# Patient Record
Sex: MALE | Race: BLACK OR AFRICAN AMERICAN | NOT HISPANIC OR LATINO | URBAN - METROPOLITAN AREA
[De-identification: names, ages, dates, MRNs, and addresses within clinical notes are randomized per-mention and may not be internally consistent; named-entity substitution may affect disease eponyms.]

---

## 2017-09-11 ENCOUNTER — EMERGENCY (EMERGENCY)
Facility: HOSPITAL | Age: 57
LOS: 1 days | Discharge: HOME | End: 2017-09-11
Attending: EMERGENCY MEDICINE | Admitting: EMERGENCY MEDICINE
Payer: MEDICARE

## 2017-09-11 VITALS
HEART RATE: 124 BPM | SYSTOLIC BLOOD PRESSURE: 134 MMHG | TEMPERATURE: 101 F | WEIGHT: 151.9 LBS | OXYGEN SATURATION: 97 % | RESPIRATION RATE: 18 BRPM | DIASTOLIC BLOOD PRESSURE: 89 MMHG

## 2017-09-11 DIAGNOSIS — J44.9 CHRONIC OBSTRUCTIVE PULMONARY DISEASE, UNSPECIFIED: ICD-10-CM

## 2017-09-11 DIAGNOSIS — J20.9 ACUTE BRONCHITIS, UNSPECIFIED: ICD-10-CM

## 2017-09-11 DIAGNOSIS — F17.210 NICOTINE DEPENDENCE, CIGARETTES, UNCOMPLICATED: ICD-10-CM

## 2017-09-11 DIAGNOSIS — R07.89 OTHER CHEST PAIN: ICD-10-CM

## 2017-09-11 DIAGNOSIS — Z79.82 LONG TERM (CURRENT) USE OF ASPIRIN: ICD-10-CM

## 2017-09-11 DIAGNOSIS — Z91.013 ALLERGY TO SEAFOOD: ICD-10-CM

## 2017-09-11 DIAGNOSIS — Z79.899 OTHER LONG TERM (CURRENT) DRUG THERAPY: ICD-10-CM

## 2017-09-11 DIAGNOSIS — E78.5 HYPERLIPIDEMIA, UNSPECIFIED: ICD-10-CM

## 2017-09-11 LAB
BASOPHILS NFR BLD AUTO: 1 % — SIGNIFICANT CHANGE UP (ref 0–2)
EOSINOPHIL NFR BLD AUTO: 3.8 % — SIGNIFICANT CHANGE UP (ref 0–6)
HCT VFR BLD CALC: 41.5 % — SIGNIFICANT CHANGE UP (ref 39–50)
HGB BLD-MCNC: 14 G/DL — SIGNIFICANT CHANGE UP (ref 13–17)
LACTATE SERPL-SCNC: 2 MMOL/L — SIGNIFICANT CHANGE UP (ref 0.5–2)
LYMPHOCYTES # BLD AUTO: 33.7 % — SIGNIFICANT CHANGE UP (ref 13–44)
MCHC RBC-ENTMCNC: 32.1 PG — SIGNIFICANT CHANGE UP (ref 27–34)
MCHC RBC-ENTMCNC: 33.7 G/DL — SIGNIFICANT CHANGE UP (ref 32–36)
MCV RBC AUTO: 95.2 FL — SIGNIFICANT CHANGE UP (ref 80–100)
MONOCYTES NFR BLD AUTO: 9.9 % — SIGNIFICANT CHANGE UP (ref 2–14)
NEUTROPHILS NFR BLD AUTO: 51.6 % — SIGNIFICANT CHANGE UP (ref 43–77)
PLATELET # BLD AUTO: 254 K/UL — SIGNIFICANT CHANGE UP (ref 150–400)
RBC # BLD: 4.36 M/UL — SIGNIFICANT CHANGE UP (ref 4.2–5.8)
RBC # FLD: 13.4 % — SIGNIFICANT CHANGE UP (ref 10.3–16.9)
WBC # BLD: 5.8 K/UL — SIGNIFICANT CHANGE UP (ref 3.8–10.5)
WBC # FLD AUTO: 5.8 K/UL — SIGNIFICANT CHANGE UP (ref 3.8–10.5)

## 2017-09-11 PROCEDURE — 99285 EMERGENCY DEPT VISIT HI MDM: CPT | Mod: 25

## 2017-09-11 PROCEDURE — 93010 ELECTROCARDIOGRAM REPORT: CPT

## 2017-09-11 PROCEDURE — 71020: CPT | Mod: 26

## 2017-09-11 RX ORDER — ACETAMINOPHEN 500 MG
650 TABLET ORAL ONCE
Qty: 0 | Refills: 0 | Status: COMPLETED | OUTPATIENT
Start: 2017-09-11 | End: 2017-09-11

## 2017-09-11 RX ORDER — KETOROLAC TROMETHAMINE 30 MG/ML
30 SYRINGE (ML) INJECTION ONCE
Qty: 0 | Refills: 0 | Status: DISCONTINUED | OUTPATIENT
Start: 2017-09-11 | End: 2017-09-11

## 2017-09-11 RX ORDER — SODIUM CHLORIDE 9 MG/ML
1000 INJECTION INTRAMUSCULAR; INTRAVENOUS; SUBCUTANEOUS ONCE
Qty: 0 | Refills: 0 | Status: COMPLETED | OUTPATIENT
Start: 2017-09-11 | End: 2017-09-11

## 2017-09-11 RX ADMIN — Medication 650 MILLIGRAM(S): at 23:42

## 2017-09-11 RX ADMIN — Medication 30 MILLIGRAM(S): at 23:42

## 2017-09-11 RX ADMIN — SODIUM CHLORIDE 2000 MILLILITER(S): 9 INJECTION INTRAMUSCULAR; INTRAVENOUS; SUBCUTANEOUS at 23:42

## 2017-09-11 NOTE — ED ADULT NURSE NOTE - OBJECTIVE STATEMENT
Pt to the ER c/o left sided chest pain, shortness of breath, cough with mucous, low grade fever/chills for past few days.  Associated with left arm discomfort/ numbness that has been there on and off for past 6 years.  Did not take anything for symptoms today.  + smoking, denies drug use.  Pt denies fevers, sick contacts, or travel out of the country in the last 3 weeks.  Upon arrival to the ER, pt is AAO X 4, fully verbal and mobile, gait steady.  Torso and extremities are warm and dry, skin color natural, and turgor normal.  Mucus membranes moist and pink.  HEENT intact. +PERRLA.  Trachea midline. No JVD.  Lungs sound  CTA X 4.  Resps slow and even, non labored.  S1, S2. No crepitus or signs of trauma.  Abdomen soft and round, non distended, and non tender to palpation with normal active BS noted X 4.  Call bell in reach, patient's bed locked, and placed in lowest position.  Pt provided with emotional support, comfort, frequent rounding, and POC reviewed.  Understanding verbalized and denies additional needs at this time. Will continue to monitor.

## 2017-09-11 NOTE — ED PROVIDER NOTE - OBJECTIVE STATEMENT
here with stabbing left sided chest pain, shortness of breath, cough with mucous, low grade fever/chills for past few days.  Associated with left arm discomfort/ numbness that has been there on and off for past 6 years.  Did not take anything for symptoms today.  + smoking, denies drug use

## 2017-09-11 NOTE — ED PROVIDER NOTE - MEDICAL DECISION MAKING DETAILS
cough, sob, fever, concern for pneumonia/ acute bronchitis/ less likely pe, acs, pneumothorax.  cxr done along with labs and ecg.  tylenol/toradol and fluid bolus for symptoms with improvement.  left side/arm pain chronic per history. cough, sob, fever, concern for pneumonia/ acute bronchitis/ less likely pe, acs, pneumothorax.  cxr done along with labs and ecg.  tylenol/toradol and fluid bolus for symptoms with improvement.  left side/arm pain chronic per history.  given history of copd, will treat with zpak

## 2017-09-11 NOTE — ED PROVIDER NOTE - PROGRESS NOTE DETAILS
states he is feeling better but just anxious.  discussed normal labs, cxr.  requesting to go home but would like a dose of ativan before discharge

## 2017-09-11 NOTE — ED ADULT NURSE NOTE - PMH
Anxiety    Chronic back pain greater than 3 months duration    COPD (chronic obstructive pulmonary disease)    High cholesterol    Insomnia

## 2017-09-12 VITALS
RESPIRATION RATE: 16 BRPM | HEART RATE: 110 BPM | SYSTOLIC BLOOD PRESSURE: 146 MMHG | DIASTOLIC BLOOD PRESSURE: 96 MMHG | TEMPERATURE: 98 F | OXYGEN SATURATION: 96 %

## 2017-09-12 LAB
ALBUMIN SERPL ELPH-MCNC: 4.7 G/DL — SIGNIFICANT CHANGE UP (ref 3.3–5)
ALP SERPL-CCNC: 134 U/L — HIGH (ref 40–120)
ALT FLD-CCNC: 28 U/L — SIGNIFICANT CHANGE UP (ref 10–45)
ANION GAP SERPL CALC-SCNC: 18 MMOL/L — HIGH (ref 5–17)
APPEARANCE UR: CLEAR — SIGNIFICANT CHANGE UP
APTT BLD: 30.2 SEC — SIGNIFICANT CHANGE UP (ref 27.5–37.4)
AST SERPL-CCNC: 31 U/L — SIGNIFICANT CHANGE UP (ref 10–40)
BILIRUB SERPL-MCNC: 0.2 MG/DL — SIGNIFICANT CHANGE UP (ref 0.2–1.2)
BILIRUB UR-MCNC: NEGATIVE — SIGNIFICANT CHANGE UP
BUN SERPL-MCNC: 10 MG/DL — SIGNIFICANT CHANGE UP (ref 7–23)
CALCIUM SERPL-MCNC: 9.4 MG/DL — SIGNIFICANT CHANGE UP (ref 8.4–10.5)
CHLORIDE SERPL-SCNC: 97 MMOL/L — SIGNIFICANT CHANGE UP (ref 96–108)
CK MB CFR SERPL CALC: 1 NG/ML — SIGNIFICANT CHANGE UP (ref 0–6.7)
CO2 SERPL-SCNC: 25 MMOL/L — SIGNIFICANT CHANGE UP (ref 22–31)
COLOR SPEC: YELLOW — SIGNIFICANT CHANGE UP
CREAT SERPL-MCNC: 1 MG/DL — SIGNIFICANT CHANGE UP (ref 0.5–1.3)
DIFF PNL FLD: NEGATIVE — SIGNIFICANT CHANGE UP
GLUCOSE SERPL-MCNC: 104 MG/DL — HIGH (ref 70–99)
GLUCOSE UR QL: NEGATIVE — SIGNIFICANT CHANGE UP
INR BLD: 0.96 — SIGNIFICANT CHANGE UP (ref 0.88–1.16)
KETONES UR-MCNC: NEGATIVE — SIGNIFICANT CHANGE UP
LEUKOCYTE ESTERASE UR-ACNC: NEGATIVE — SIGNIFICANT CHANGE UP
NITRITE UR-MCNC: NEGATIVE — SIGNIFICANT CHANGE UP
PH UR: 6 — SIGNIFICANT CHANGE UP (ref 5–8)
POTASSIUM SERPL-MCNC: 3.7 MMOL/L — SIGNIFICANT CHANGE UP (ref 3.5–5.3)
POTASSIUM SERPL-SCNC: 3.7 MMOL/L — SIGNIFICANT CHANGE UP (ref 3.5–5.3)
PROT SERPL-MCNC: 8.2 G/DL — SIGNIFICANT CHANGE UP (ref 6–8.3)
PROT UR-MCNC: NEGATIVE MG/DL — SIGNIFICANT CHANGE UP
PROTHROM AB SERPL-ACNC: 10.7 SEC — SIGNIFICANT CHANGE UP (ref 9.8–12.7)
SODIUM SERPL-SCNC: 140 MMOL/L — SIGNIFICANT CHANGE UP (ref 135–145)
SP GR SPEC: 1.01 — SIGNIFICANT CHANGE UP (ref 1–1.03)
TROPONIN T SERPL-MCNC: <0.01 NG/ML — SIGNIFICANT CHANGE UP (ref 0–0.01)
UROBILINOGEN FLD QL: 0.2 E.U./DL — SIGNIFICANT CHANGE UP

## 2017-09-12 PROCEDURE — 96374 THER/PROPH/DIAG INJ IV PUSH: CPT

## 2017-09-12 PROCEDURE — 80053 COMPREHEN METABOLIC PANEL: CPT

## 2017-09-12 PROCEDURE — 71046 X-RAY EXAM CHEST 2 VIEWS: CPT

## 2017-09-12 PROCEDURE — 83605 ASSAY OF LACTIC ACID: CPT

## 2017-09-12 PROCEDURE — 93005 ELECTROCARDIOGRAM TRACING: CPT

## 2017-09-12 PROCEDURE — 82550 ASSAY OF CK (CPK): CPT

## 2017-09-12 PROCEDURE — 99284 EMERGENCY DEPT VISIT MOD MDM: CPT | Mod: 25

## 2017-09-12 PROCEDURE — 81003 URINALYSIS AUTO W/O SCOPE: CPT

## 2017-09-12 PROCEDURE — 85730 THROMBOPLASTIN TIME PARTIAL: CPT

## 2017-09-12 PROCEDURE — 87040 BLOOD CULTURE FOR BACTERIA: CPT

## 2017-09-12 PROCEDURE — 84484 ASSAY OF TROPONIN QUANT: CPT

## 2017-09-12 PROCEDURE — 87086 URINE CULTURE/COLONY COUNT: CPT

## 2017-09-12 PROCEDURE — 85610 PROTHROMBIN TIME: CPT

## 2017-09-12 PROCEDURE — 82553 CREATINE MB FRACTION: CPT

## 2017-09-12 PROCEDURE — 36415 COLL VENOUS BLD VENIPUNCTURE: CPT

## 2017-09-12 PROCEDURE — 85025 COMPLETE CBC W/AUTO DIFF WBC: CPT

## 2017-09-12 RX ORDER — TRAZODONE HCL 50 MG
2 TABLET ORAL
Qty: 0 | Refills: 0 | COMMUNITY

## 2017-09-12 RX ORDER — AZITHROMYCIN 500 MG/1
2 TABLET, FILM COATED ORAL
Qty: 6 | Refills: 0 | OUTPATIENT
Start: 2017-09-12

## 2017-09-12 RX ORDER — ZOLPIDEM TARTRATE 10 MG/1
1 TABLET ORAL
Qty: 0 | Refills: 0 | COMMUNITY

## 2017-09-12 RX ORDER — QUETIAPINE FUMARATE 200 MG/1
1 TABLET, FILM COATED ORAL
Qty: 0 | Refills: 0 | COMMUNITY

## 2017-09-12 RX ORDER — ASPIRIN/CALCIUM CARB/MAGNESIUM 324 MG
1 TABLET ORAL
Qty: 0 | Refills: 0 | COMMUNITY

## 2017-09-12 RX ORDER — IBUPROFEN 200 MG
1 TABLET ORAL
Qty: 30 | Refills: 0 | OUTPATIENT
Start: 2017-09-12

## 2017-09-12 RX ADMIN — Medication 1 MILLIGRAM(S): at 02:07

## 2017-09-12 RX ADMIN — Medication 30 MILLIGRAM(S): at 01:54

## 2017-09-13 LAB
CULTURE RESULTS: NO GROWTH — SIGNIFICANT CHANGE UP
SPECIMEN SOURCE: SIGNIFICANT CHANGE UP

## 2017-09-17 LAB
CULTURE RESULTS: SIGNIFICANT CHANGE UP
CULTURE RESULTS: SIGNIFICANT CHANGE UP
SPECIMEN SOURCE: SIGNIFICANT CHANGE UP
SPECIMEN SOURCE: SIGNIFICANT CHANGE UP

## 2019-08-28 PROBLEM — J44.9 CHRONIC OBSTRUCTIVE PULMONARY DISEASE, UNSPECIFIED: Chronic | Status: ACTIVE | Noted: 2017-09-11

## 2019-08-28 PROBLEM — M54.9 DORSALGIA, UNSPECIFIED: Chronic | Status: ACTIVE | Noted: 2017-09-12

## 2019-08-28 PROBLEM — F41.9 ANXIETY DISORDER, UNSPECIFIED: Chronic | Status: ACTIVE | Noted: 2017-09-12

## 2019-08-28 PROBLEM — E78.00 PURE HYPERCHOLESTEROLEMIA, UNSPECIFIED: Chronic | Status: ACTIVE | Noted: 2017-09-11

## 2019-08-28 PROBLEM — G47.00 INSOMNIA, UNSPECIFIED: Chronic | Status: ACTIVE | Noted: 2017-09-12

## 2019-08-29 ENCOUNTER — APPOINTMENT (OUTPATIENT)
Dept: INTERNAL MEDICINE | Facility: HOME HEALTH | Age: 59
End: 2019-08-29
Payer: MEDICARE

## 2019-08-29 VITALS
HEART RATE: 101 BPM | OXYGEN SATURATION: 97 % | DIASTOLIC BLOOD PRESSURE: 90 MMHG | BODY MASS INDEX: 24.01 KG/M2 | WEIGHT: 153 LBS | HEIGHT: 67 IN | RESPIRATION RATE: 18 BRPM | TEMPERATURE: 98.1 F | SYSTOLIC BLOOD PRESSURE: 150 MMHG

## 2019-08-29 DIAGNOSIS — Z00.00 ENCOUNTER FOR GENERAL ADULT MEDICAL EXAMINATION W/OUT ABNORMAL FINDINGS: ICD-10-CM

## 2019-08-29 DIAGNOSIS — I25.2 OLD MYOCARDIAL INFARCTION: ICD-10-CM

## 2019-08-29 PROCEDURE — 99345 HOME/RES VST NEW HIGH MDM 75: CPT

## 2019-08-29 PROCEDURE — 99497 ADVNCD CARE PLAN 30 MIN: CPT

## 2019-08-29 PROCEDURE — 99406 BEHAV CHNG SMOKING 3-10 MIN: CPT

## 2019-08-31 PROBLEM — I25.2 HISTORY OF MYOCARDIAL INFARCTION: Status: RESOLVED | Noted: 2019-08-31 | Resolved: 2019-08-31

## 2019-08-31 PROBLEM — Z00.00 ENCOUNTER FOR PREVENTIVE HEALTH EXAMINATION: Status: ACTIVE | Noted: 2019-08-29

## 2019-09-09 LAB
25(OH)D3 SERPL-MCNC: 20 NG/ML
ALBUMIN SERPL ELPH-MCNC: 4.7 G/DL
ALP BLD-CCNC: 97 U/L
ALT SERPL-CCNC: 29 U/L
ANION GAP SERPL CALC-SCNC: 21 MMOL/L
AST SERPL-CCNC: 29 U/L
BASOPHILS # BLD AUTO: 0.05 K/UL
BASOPHILS NFR BLD AUTO: 1.3 %
BILIRUB SERPL-MCNC: 0.4 MG/DL
BUN SERPL-MCNC: 19 MG/DL
CALCIUM SERPL-MCNC: 10 MG/DL
CHLORIDE SERPL-SCNC: 92 MMOL/L
CHOLEST SERPL-MCNC: 194 MG/DL
CHOLEST/HDLC SERPL: 3.9 RATIO
CO2 SERPL-SCNC: 22 MMOL/L
CREAT SERPL-MCNC: 1.34 MG/DL
EOSINOPHIL # BLD AUTO: 0.04 K/UL
EOSINOPHIL NFR BLD AUTO: 1 %
ESTIMATED AVERAGE GLUCOSE: 108 MG/DL
GLUCOSE SERPL-MCNC: 101 MG/DL
HBA1C MFR BLD HPLC: 5.4 %
HBV SURFACE AB SERPL IA-ACNC: 704 MIU/ML
HCT VFR BLD CALC: 43.5 %
HCV RNA SERPL NAA DL=5-ACNC: NOT DETECTED IU/ML
HCV RNA SERPL NAA+PROBE-LOG IU: NOT DETECTED LOGIU/ML
HDLC SERPL-MCNC: 50 MG/DL
HGB BLD-MCNC: 15.1 G/DL
HIV1+2 AB SPEC QL IA.RAPID: NONREACTIVE
IMM GRANULOCYTES NFR BLD AUTO: 0.3 %
IRON SATN MFR SERPL: 41 %
IRON SERPL-MCNC: 141 UG/DL
LDLC SERPL CALC-MCNC: NORMAL MG/DL
LYMPHOCYTES # BLD AUTO: 1.59 K/UL
LYMPHOCYTES NFR BLD AUTO: 41.6 %
MAN DIFF?: NORMAL
MCHC RBC-ENTMCNC: 34.3 PG
MCHC RBC-ENTMCNC: 34.7 GM/DL
MCV RBC AUTO: 98.9 FL
MONOCYTES # BLD AUTO: 0.39 K/UL
MONOCYTES NFR BLD AUTO: 10.2 %
NEUTROPHILS # BLD AUTO: 1.74 K/UL
NEUTROPHILS NFR BLD AUTO: 45.6 %
PLATELET # BLD AUTO: 303 K/UL
POTASSIUM SERPL-SCNC: 5 MMOL/L
PROT SERPL-MCNC: 7.7 G/DL
PSA FREE FLD-MCNC: 25 %
PSA FREE SERPL-MCNC: 0.61 NG/ML
PSA SERPL-MCNC: 2.41 NG/ML
RBC # BLD: 4.4 M/UL
RBC # FLD: 13.4 %
SODIUM SERPL-SCNC: 135 MMOL/L
T4 FREE SERPL-MCNC: 1.2 NG/DL
TIBC SERPL-MCNC: 341 UG/DL
TRIGL SERPL-MCNC: 765 MG/DL
TSH SERPL-ACNC: 1.05 UIU/ML
UIBC SERPL-MCNC: 200 UG/DL
VIT B12 SERPL-MCNC: 489 PG/ML
WBC # FLD AUTO: 3.82 K/UL

## 2019-09-26 ENCOUNTER — APPOINTMENT (OUTPATIENT)
Dept: INTERNAL MEDICINE | Facility: HOME HEALTH | Age: 59
End: 2019-09-26
Payer: MEDICARE

## 2019-09-26 VITALS
DIASTOLIC BLOOD PRESSURE: 70 MMHG | HEART RATE: 101 BPM | OXYGEN SATURATION: 96 % | SYSTOLIC BLOOD PRESSURE: 130 MMHG | RESPIRATION RATE: 16 BRPM | TEMPERATURE: 97.7 F

## 2019-09-26 PROCEDURE — 99350 HOME/RES VST EST HIGH MDM 60: CPT

## 2019-09-26 PROCEDURE — G0008: CPT

## 2019-09-26 PROCEDURE — 90662 IIV NO PRSV INCREASED AG IM: CPT

## 2019-09-27 ENCOUNTER — RX RENEWAL (OUTPATIENT)
Age: 59
End: 2019-09-27

## 2019-10-24 ENCOUNTER — APPOINTMENT (OUTPATIENT)
Dept: INTERNAL MEDICINE | Facility: HOME HEALTH | Age: 59
End: 2019-10-24
Payer: MEDICARE

## 2019-10-24 VITALS
SYSTOLIC BLOOD PRESSURE: 130 MMHG | DIASTOLIC BLOOD PRESSURE: 90 MMHG | RESPIRATION RATE: 16 BRPM | TEMPERATURE: 99 F | OXYGEN SATURATION: 97 % | HEART RATE: 105 BPM

## 2019-10-24 DIAGNOSIS — Z11.4 ENCOUNTER FOR SCREENING FOR HUMAN IMMUNODEFICIENCY VIRUS [HIV]: ICD-10-CM

## 2019-10-24 PROCEDURE — 99350 HOME/RES VST EST HIGH MDM 60: CPT

## 2019-10-24 NOTE — PHYSICAL EXAM
[No Acute Distress] : no acute distress [EOMI] : extra ocular movement intact [Supple] : the neck was supple [No Respiratory Distress] : no respiratory distress [No Accessory Muscle Use] : no accessory muscle use [Normal Rate] : heart rate was normal  [Normal S1, S2] : normal S1 and S2 [No Murmurs] : no murmurs heard [No Edema] : there was no peripheral edema [Normal Bowel Sounds] : normal bowel sounds [Non Tender] : non-tender [Soft] : abdomen soft [Patient Refused] : rectal exam was refused by the patient [No CVA Tenderness] : no ~M costovertebral angle tenderness [Cranial Nerves Intact] : cranial nerves 2-12 were intact [Oriented x3] : oriented to person, place, and time [Normal Affect] : the affect was normal [Normal Mood] : the mood was normal [FreeTextEntry1] : colonoscopy recommended [de-identified] : pn to lt side and shoulder

## 2019-10-24 NOTE — REASON FOR VISIT
[Pre-Visit Preparation] : pre-visit preparation was done [Follow-Up] : a follow-up visit [FreeTextEntry2] : notes, meds, labs reviewed

## 2019-10-24 NOTE — HISTORY OF PRESENT ILLNESS
[Patient] : patient [FreeTextEntry2] : 59 y.o. male lives in [facility] for past  years. Pt is alert and oriented to time place, person and situation. Reports he has COPD has not taken his medication for past 3 days. Did not pick them up from the pharmacy, SW reports meds have been ready for 2 dys.\par \par  Pt states he was seen in the ER a week ago dx with pneumonia and has been taking antibiotics, discharge notes support claim. Pt states he was diagnosed with COPD was taking Symbicort. Ran out of meds, has not taking it still smokes. Recommend he sees pulmonology for a PFT for staging of COPD. Number for pulmonary at [facility] given to pt. \par Also referred pt to cardiology for c/o chest pain and report of a previous cardiac procedure, he could not explain, pulse up to 105 b/m, ECHO with grade 1 diastolic dysfunction and estimated elevated pulmonary artery systolic pressure to 33 mmhg.\par

## 2019-11-13 ENCOUNTER — APPOINTMENT (OUTPATIENT)
Dept: HEART AND VASCULAR | Facility: CLINIC | Age: 59
End: 2019-11-13
Payer: MEDICARE

## 2019-11-13 VITALS
OXYGEN SATURATION: 97 % | SYSTOLIC BLOOD PRESSURE: 130 MMHG | HEIGHT: 67 IN | DIASTOLIC BLOOD PRESSURE: 90 MMHG | HEART RATE: 124 BPM | WEIGHT: 150 LBS | TEMPERATURE: 97.6 F | BODY MASS INDEX: 23.54 KG/M2

## 2019-11-13 PROCEDURE — 93000 ELECTROCARDIOGRAM COMPLETE: CPT

## 2019-11-13 PROCEDURE — 99203 OFFICE O/P NEW LOW 30 MIN: CPT

## 2019-11-13 NOTE — HISTORY OF PRESENT ILLNESS
[FreeTextEntry1] : \par 59 M COPD HTN here for atypical sscp worse with inspiration and dorman.  \par states he had angiogram in [place] within 5 years that was not obsturctive, echo this past october normal LVEF and all motion +PHTN\par pnd - no\par orthopnea - no\par leg edema - no\par syncope - no\par dizziness - no\par palpitations - no\par leg pain - no\par SOB - yes\par chest pain - yes\par \par location: chest\par duration: inspiration\par  modifying factors: rest\par timing: minutes\par severity: 4/10\par \par ERCG NSR sinus tack 108bpm normal intervals and segments

## 2019-11-13 NOTE — DISCUSSION/SUMMARY
[FreeTextEntry1] : \par SOB/CP/MIDDLETON\par - normal cath within 5 years in non diabetic with normal echo LV EF and LV pressures nor RWMA.  paitn is pleuritic and pt is active smoker.  ecg in non ischemic.  PASP is elevated out of proportion to LV pressures suggestive of WHO 3 COPD cause and lam middleton 2/2 copd from smoking.\par -smoking cessation advised\par -pulm eval advised

## 2019-11-13 NOTE — PHYSICAL EXAM
[General Appearance - Well Developed] : well developed [Normal Appearance] : normal appearance [Well Groomed] : well groomed [General Appearance - Well Nourished] : well nourished [No Deformities] : no deformities [General Appearance - In No Acute Distress] : no acute distress [Normal Conjunctiva] : the conjunctiva exhibited no abnormalities [Eyelids - No Xanthelasma] : the eyelids demonstrated no xanthelasmas [Normal Oral Mucosa] : normal oral mucosa [No Oral Pallor] : no oral pallor [No Oral Cyanosis] : no oral cyanosis [Normal Jugular Venous A Waves Present] : normal jugular venous A waves present [No Jugular Venous Doshi A Waves] : no jugular venous doshi A waves [Normal Jugular Venous V Waves Present] : normal jugular venous V waves present [Heart Rate And Rhythm] : heart rate and rhythm were normal [Heart Sounds] : normal S1 and S2 [Murmurs] : no murmurs present [Respiration, Rhythm And Depth] : normal respiratory rhythm and effort [Exaggerated Use Of Accessory Muscles For Inspiration] : no accessory muscle use [Abdomen Soft] : soft [Auscultation Breath Sounds / Voice Sounds] : lungs were clear to auscultation bilaterally [Abdomen Tenderness] : non-tender [Abdomen Mass (___ Cm)] : no abdominal mass palpated [Abnormal Walk] : normal gait [Gait - Sufficient For Exercise Testing] : the gait was sufficient for exercise testing [Petechial Hemorrhages (___cm)] : no petechial hemorrhages [Cyanosis, Localized] : no localized cyanosis [Nail Clubbing] : no clubbing of the fingernails [Skin Color & Pigmentation] : normal skin color and pigmentation [No Venous Stasis] : no venous stasis [] : no rash [No Skin Ulcers] : no skin ulcer [Skin Lesions] : no skin lesions [Oriented To Time, Place, And Person] : oriented to person, place, and time [No Xanthoma] : no  xanthoma was observed [Affect] : the affect was normal [Mood] : the mood was normal [No Anxiety] : not feeling anxious

## 2019-11-21 ENCOUNTER — APPOINTMENT (OUTPATIENT)
Dept: PULMONOLOGY | Facility: CLINIC | Age: 59
End: 2019-11-21

## 2019-11-22 ENCOUNTER — RX RENEWAL (OUTPATIENT)
Age: 59
End: 2019-11-22

## 2020-01-21 ENCOUNTER — APPOINTMENT (OUTPATIENT)
Dept: HOME HEALTH SERVICES | Facility: HOME HEALTH | Age: 60
End: 2020-01-21
Payer: MEDICARE

## 2020-01-21 PROCEDURE — 99350 HOME/RES VST EST HIGH MDM 60: CPT

## 2020-01-22 VITALS
RESPIRATION RATE: 16 BRPM | TEMPERATURE: 98.3 F | OXYGEN SATURATION: 98 % | HEART RATE: 110 BPM | DIASTOLIC BLOOD PRESSURE: 90 MMHG | SYSTOLIC BLOOD PRESSURE: 130 MMHG

## 2020-01-22 NOTE — HISTORY OF PRESENT ILLNESS
[Patient] : patient [FreeTextEntry2] : 59 y.o. male lives in [facility] for past  years. Pt is alert and oriented to time place, person and situation. Reports he has COPD has not taken his medication for past 3 days. Did not pick them up from the pharmacy, SW reports meds have been ready for 2 dys.\par \par Also referred pt to cardiology for c/o chest pain and report of a previous cardiac procedure, he could not explain, pulse up to 105 b/m, ECHO with grade 1 diastolic dysfunction and estimated elevated pulmonary artery systolic pressure to 33 mmhg. Still has not gone to see cardiologist. Made appointment twice and then cancelled. \par \par

## 2020-01-22 NOTE — REASON FOR VISIT
[Follow-Up] : a follow-up visit [Pre-Visit Preparation] : pre-visit preparation was done [FreeTextEntry2] : notes, labs, meds reviewed

## 2020-01-22 NOTE — PHYSICAL EXAM
[No Acute Distress] : no acute distress [EOMI] : extra ocular movement intact [Supple] : the neck was supple [No Respiratory Distress] : no respiratory distress [No Accessory Muscle Use] : no accessory muscle use [Normal Rate] : heart rate was normal  [Normal S1, S2] : normal S1 and S2 [No Murmurs] : no murmurs heard [No Edema] : there was no peripheral edema [Normal Bowel Sounds] : normal bowel sounds [Non Tender] : non-tender [Soft] : abdomen soft [Patient Refused] : rectal exam was refused by the patient [No CVA Tenderness] : no ~M costovertebral angle tenderness [Cranial Nerves Intact] : cranial nerves 2-12 were intact [Oriented x3] : oriented to person, place, and time [Normal Affect] : the affect was normal [Normal Mood] : the mood was normal [FreeTextEntry1] : colonoscopy recommended [de-identified] : pn to lt side and shoulder

## 2020-02-07 ENCOUNTER — APPOINTMENT (OUTPATIENT)
Dept: HOME HEALTH SERVICES | Facility: HOME HEALTH | Age: 60
End: 2020-02-07
Payer: MEDICARE

## 2020-02-07 PROCEDURE — 99350 HOME/RES VST EST HIGH MDM 60: CPT

## 2020-02-08 VITALS
OXYGEN SATURATION: 94 % | DIASTOLIC BLOOD PRESSURE: 80 MMHG | SYSTOLIC BLOOD PRESSURE: 140 MMHG | RESPIRATION RATE: 16 BRPM | HEART RATE: 116 BPM | TEMPERATURE: 99.3 F

## 2020-03-02 LAB
ALBUMIN SERPL ELPH-MCNC: 5 G/DL
ALP BLD-CCNC: 127 U/L
ALT SERPL-CCNC: 41 U/L
ANION GAP SERPL CALC-SCNC: 18 MMOL/L
AST SERPL-CCNC: 37 U/L
BASOPHILS # BLD AUTO: 0.05 K/UL
BASOPHILS NFR BLD AUTO: 1 %
BILIRUB SERPL-MCNC: 0.3 MG/DL
BUN SERPL-MCNC: 10 MG/DL
CALCIUM SERPL-MCNC: 9.8 MG/DL
CHLORIDE SERPL-SCNC: 100 MMOL/L
CHOLEST SERPL-MCNC: 215 MG/DL
CHOLEST/HDLC SERPL: 3.2 RATIO
CO2 SERPL-SCNC: 20 MMOL/L
CREAT SERPL-MCNC: 1.16 MG/DL
EOSINOPHIL # BLD AUTO: 0.12 K/UL
EOSINOPHIL NFR BLD AUTO: 2.3 %
ESTIMATED AVERAGE GLUCOSE: 108 MG/DL
GLUCOSE SERPL-MCNC: 94 MG/DL
HBA1C MFR BLD HPLC: 5.4 %
HCT VFR BLD CALC: 40.4 %
HDLC SERPL-MCNC: 67 MG/DL
HGB BLD-MCNC: 13.9 G/DL
IMM GRANULOCYTES NFR BLD AUTO: 0.2 %
LDLC SERPL CALC-MCNC: NORMAL MG/DL
LYMPHOCYTES # BLD AUTO: 1.82 K/UL
LYMPHOCYTES NFR BLD AUTO: 35.2 %
MAN DIFF?: NORMAL
MCHC RBC-ENTMCNC: 34.4 GM/DL
MCHC RBC-ENTMCNC: 34.8 PG
MCV RBC AUTO: 101.3 FL
MONOCYTES # BLD AUTO: 0.56 K/UL
MONOCYTES NFR BLD AUTO: 10.8 %
NEUTROPHILS # BLD AUTO: 2.61 K/UL
NEUTROPHILS NFR BLD AUTO: 50.5 %
PLATELET # BLD AUTO: 257 K/UL
POTASSIUM SERPL-SCNC: 4.2 MMOL/L
PROT SERPL-MCNC: 7.5 G/DL
PSA FREE FLD-MCNC: 32 %
PSA FREE SERPL-MCNC: 0.74 NG/ML
PSA SERPL-MCNC: 2.29 NG/ML
RBC # BLD: 3.99 M/UL
RBC # FLD: 13.4 %
SODIUM SERPL-SCNC: 138 MMOL/L
TRIGL SERPL-MCNC: 460 MG/DL
TSH SERPL-ACNC: 1.41 UIU/ML
WBC # FLD AUTO: 5.17 K/UL

## 2020-05-14 NOTE — HISTORY OF PRESENT ILLNESS
[Patient] : patient [FreeTextEntry2] : 59 y.o. male lives in [facility] for past  years. Pt is alert and oriented to time place, person and situation. Reports he has COPD has not taken his medication for past 3 days. Did not pick them up from the pharmacy, SW reports meds have been ready for 2 dys. Pt states he was seen in the ER a week ago dx with pneumonia and has been taking antibiotics, discharge notes support claim.

## 2020-05-14 NOTE — PHYSICAL EXAM
[No Acute Distress] : no acute distress [EOMI] : extra ocular movement intact [Supple] : the neck was supple [No Respiratory Distress] : no respiratory distress [No Accessory Muscle Use] : no accessory muscle use [Normal Rate] : heart rate was normal  [Normal S1, S2] : normal S1 and S2 [No Edema] : there was no peripheral edema [No Murmurs] : no murmurs heard [Normal Bowel Sounds] : normal bowel sounds [Non Tender] : non-tender [Patient Refused] : rectal exam was refused by the patient [Soft] : abdomen soft [No CVA Tenderness] : no ~M costovertebral angle tenderness [Cranial Nerves Intact] : cranial nerves 2-12 were intact [Oriented x3] : oriented to person, place, and time [Normal Affect] : the affect was normal [Normal Mood] : the mood was normal [FreeTextEntry1] : colonoscopy recommended [de-identified] : pn to lt side and shoulder

## 2020-05-14 NOTE — REASON FOR VISIT
[Follow-Up] : a follow-up visit [Other: _____] : [unfilled] [Pre-Visit Preparation] : pre-visit preparation was done [FreeTextEntry2] : notes, meds, labs reviewed

## 2020-05-14 NOTE — HEALTH RISK ASSESSMENT
[Independent] : managing finances [Some assistance needed] : housekeeping [No falls in past year] : Patient reported no falls in the past year [HRA Reviewed] : Health risk assessment reviewed [No] : The patient does not have visual impairment [TimeGetUpGo] : 10

## 2020-05-15 ENCOUNTER — APPOINTMENT (OUTPATIENT)
Dept: HOME HEALTH SERVICES | Facility: HOME HEALTH | Age: 60
End: 2020-05-15

## 2020-05-17 NOTE — HISTORY OF PRESENT ILLNESS
[Patient] : patient [FreeTextEntry2] : 59 y.o. male lives in [facility] for past  years. Pt is alert and oriented to time place, person and situation. Reports he has COPD has not taken his medication for past 3 days.\par \par See at [facility] for Dental pain, dental abscess, and dental abscess with facial cellulitis. Given Augmentin and Tylenol and to f/u with dentist for tooth extraction. He was instructed to follow-up in 2 days - the [place] for [facility] at [address] [place] [address] [phone]. Pt will need extensive work for significant \par \par Reviewed home care with pt to rinse with salt water after each meal. To brush at least 2 times daily. To f/u with dentist for tooth extraction and not to wait for infection to reappear. Discussed the relationship between dental caries and heart disease, to take all the medication until finished. States he has stopped drinking and decreased the number of cigarettes.

## 2020-05-17 NOTE — REVIEW OF SYSTEMS
[Fever] : no fever [Chills] : no chills [Fatigue] : no fatigue [FreeTextEntry2] : 59 o male awake and alert, mild facial swelling

## 2020-05-17 NOTE — REASON FOR VISIT
[Post ER] : a Post ER visit [Pre-Visit Preparation] : pre-visit preparation was done [FreeTextEntry2] : hosp d/c notes

## 2020-05-17 NOTE — HEALTH RISK ASSESSMENT
[HRA Reviewed] : Health risk assessment reviewed [No falls in past year] : Patient reported no falls in the past year [No] : The patient does not have visual impairment [TimeGetUpGo] : 10

## 2020-05-17 NOTE — PHYSICAL EXAM
[No Acute Distress] : no acute distress [EOMI] : extra ocular movement intact [Normal Outer Ear/Nose] : the ears and nose were normal in appearance [Supple] : the neck was supple [No Respiratory Distress] : no respiratory distress [Normal S1, S2] : normal S1 and S2 [Normal Rate] : heart rate was normal  [No Accessory Muscle Use] : no accessory muscle use [Normal Bowel Sounds] : normal bowel sounds [No Murmurs] : no murmurs heard [No Edema] : there was no peripheral edema [Non Tender] : non-tender [Soft] : abdomen soft [Patient Refused] : rectal exam was refused by the patient [No CVA Tenderness] : no ~M costovertebral angle tenderness [Cranial Nerves Intact] : cranial nerves 2-12 were intact [Oriented x3] : oriented to person, place, and time [Normal Affect] : the affect was normal [Normal Mood] : the mood was normal [FreeTextEntry1] : colonoscopy recommended [de-identified] : facial swelling [de-identified] : pn to lt side and shoulder

## 2020-06-04 ENCOUNTER — APPOINTMENT (OUTPATIENT)
Dept: HOME HEALTH SERVICES | Facility: HOME HEALTH | Age: 60
End: 2020-06-04

## 2020-07-09 ENCOUNTER — APPOINTMENT (OUTPATIENT)
Dept: HOME HEALTH SERVICES | Facility: HOME HEALTH | Age: 60
End: 2020-07-09

## 2020-07-17 ENCOUNTER — APPOINTMENT (OUTPATIENT)
Dept: HOME HEALTH SERVICES | Facility: HOME HEALTH | Age: 60
End: 2020-07-17

## 2020-08-10 ENCOUNTER — APPOINTMENT (OUTPATIENT)
Dept: HOME HEALTH SERVICES | Facility: HOME HEALTH | Age: 60
End: 2020-08-10

## 2020-08-24 ENCOUNTER — APPOINTMENT (OUTPATIENT)
Dept: HOME HEALTH SERVICES | Facility: HOME HEALTH | Age: 60
End: 2020-08-24
Payer: MEDICARE

## 2020-08-24 PROCEDURE — 99350 HOME/RES VST EST HIGH MDM 60: CPT

## 2020-08-24 PROCEDURE — 99497 ADVNCD CARE PLAN 30 MIN: CPT

## 2020-08-25 VITALS
OXYGEN SATURATION: 96 % | TEMPERATURE: 99 F | SYSTOLIC BLOOD PRESSURE: 150 MMHG | DIASTOLIC BLOOD PRESSURE: 80 MMHG | RESPIRATION RATE: 18 BRPM | HEART RATE: 105 BPM

## 2020-08-25 NOTE — COUNSELING
[Overweight - ( BMI 25.1 - 29.9 )] : overweight -  ( BMI 25.1 - 29.9 ) [Weight counseling provided] : weight counseling provided [TLC diet recommended] : TLC diet recommended [Sodium restriction 2gm recommended] : sodium restriction 2 gm recommended [Smoke/CO Detectors] : smoke/CO detectors [Non - Smoker] : non-smoker [Completed] : Aspirin use discussion completed [] : foot exam

## 2020-08-27 NOTE — HEALTH RISK ASSESSMENT
[HRA Reviewed] : Health risk assessment reviewed [Independent] : managing finances [No falls in past year] : Patient reported no falls in the past year [Yes] : The patient does have visual impairment [TimeGetUpGo] : 15

## 2020-08-27 NOTE — REASON FOR VISIT
[Follow-Up] : a follow-up visit [Pre-Visit Preparation] : pre-visit preparation was done [FreeTextEntry2] : notes, meds, reviewed

## 2020-08-27 NOTE — HISTORY OF PRESENT ILLNESS
[Patient] : patient [FreeTextEntry2] : 59 y.o. male lives in [facility] for past  years. Pt is alert and oriented to time place, person and situation. Reports he has COPD has not taken his medication for past 3 days.\par \par See at [facility] for Dental pain, dental abscess, and dental abscess with facial cellulitis. Given Augmentin and Tylenol and to f/u with dentist for tooth extraction. He was instructed to follow-up in 2 days - the [place] for [facility] at [address] [place] [address] [phone]. Pt will need extensive work for significant \par \par Reviewed home care with pt to rinse with salt water after each meal. To brush at least 2 times daily. To f/u with dentist for tooth extraction and not to wait for infection to reappear. Discussed the relationship between dental caries and heart disease, to take all the medication until finished. States he has stopped drinking and decreased the number of cigarettes.

## 2020-08-27 NOTE — PHYSICAL EXAM
[No Acute Distress] : no acute distress [EOMI] : extra ocular movement intact [Normal Outer Ear/Nose] : the ears and nose were normal in appearance [Supple] : the neck was supple [No Respiratory Distress] : no respiratory distress [No Accessory Muscle Use] : no accessory muscle use [Normal Rate] : heart rate was normal  [Normal S1, S2] : normal S1 and S2 [No Murmurs] : no murmurs heard [No Edema] : there was no peripheral edema [Normal Bowel Sounds] : normal bowel sounds [Non Tender] : non-tender [Soft] : abdomen soft [Patient Refused] : rectal exam was refused by the patient [No CVA Tenderness] : no ~M costovertebral angle tenderness [Cranial Nerves Intact] : cranial nerves 2-12 were intact [Oriented x3] : oriented to person, place, and time [Normal Affect] : the affect was normal [Normal Mood] : the mood was normal [FreeTextEntry1] : colonoscopy recommended [de-identified] : facial swelling [de-identified] : pn to lt side and shoulder

## 2020-09-09 NOTE — HISTORY OF PRESENT ILLNESS
[Patient] : patient [FreeTextEntry2] : 59 y.o. male lives in [facility] for past  years. Pt is alert and oriented to time place, person and situation. Reports he has COPD has not taken his medication for past 3 days. Did not pick them up from the pharmacy, SW reports meds have been ready for 2 dys. Pt states he was seen in the ER a week ago dx with pneumonia and has been taking antibiotics, discharge notes support claim.

## 2020-09-09 NOTE — REASON FOR VISIT
[Initial Eval - Existing Diagnosis] : an initial evaluation of an existing diagnosis [Pre-Visit Preparation] : pre-visit preparation was done [Intercurrent Specialty/Sub-specialty Visits] : the patient has intercurrent specialty/sub-specialty visits [FreeTextEntry2] : post ER d/c notes, Ferny Kelsey  chart [FreeTextEntry3] : [facility] ER

## 2020-09-09 NOTE — HEALTH RISK ASSESSMENT
[HRA Reviewed] : Health risk assessment reviewed [Independent] : managing finances [Some assistance needed] : managing medications [No falls in past year] : Patient reported no falls in the past year [FreeTextEntry7] : NEEDS ASSISTANCE

## 2020-09-09 NOTE — PHYSICAL EXAM
[No Acute Distress] : no acute distress [EOMI] : extra ocular movement intact [Supple] : the neck was supple [No Respiratory Distress] : no respiratory distress [No Accessory Muscle Use] : no accessory muscle use [Normal Rate] : heart rate was normal  [Normal S1, S2] : normal S1 and S2 [No Edema] : there was no peripheral edema [No Murmurs] : no murmurs heard [Normal Bowel Sounds] : normal bowel sounds [Non Tender] : non-tender [Soft] : abdomen soft [Patient Refused] : rectal exam was refused by the patient [No CVA Tenderness] : no ~M costovertebral angle tenderness [Cranial Nerves Intact] : cranial nerves 2-12 were intact [Oriented x3] : oriented to person, place, and time [Normal Affect] : the affect was normal [Normal Mood] : the mood was normal [FreeTextEntry1] : colonoscopy recommended [de-identified] : pn to lt side and shoulder

## 2020-09-09 NOTE — CHRONIC CARE ASSESSMENT
[Inadequate social support] : inadequate social support [Can not Exercise (Disability)] : Exercise: The patient can not exercise due to disability [Low Fat Diet] : low fat [General Adherence] : and is generally adherent [PPS Score: ____] : Palliative Performance Scale (PPS) Score: [unfilled] [FreeTextEntry1] : Cardiac meds

## 2020-10-01 ENCOUNTER — APPOINTMENT (OUTPATIENT)
Dept: HOME HEALTH SERVICES | Facility: HOME HEALTH | Age: 60
End: 2020-10-01

## 2020-10-05 ENCOUNTER — APPOINTMENT (OUTPATIENT)
Dept: HOME HEALTH SERVICES | Facility: HOME HEALTH | Age: 60
End: 2020-10-05

## 2020-10-19 ENCOUNTER — APPOINTMENT (OUTPATIENT)
Dept: HOME HEALTH SERVICES | Facility: HOME HEALTH | Age: 60
End: 2020-10-19
Payer: MEDICARE

## 2020-10-19 PROCEDURE — 90686 IIV4 VACC NO PRSV 0.5 ML IM: CPT

## 2020-10-19 PROCEDURE — G0008: CPT

## 2020-10-19 PROCEDURE — 99350 HOME/RES VST EST HIGH MDM 60: CPT | Mod: 25

## 2020-10-20 VITALS
DIASTOLIC BLOOD PRESSURE: 98 MMHG | HEART RATE: 105 BPM | OXYGEN SATURATION: 97 % | SYSTOLIC BLOOD PRESSURE: 140 MMHG | RESPIRATION RATE: 20 BRPM | TEMPERATURE: 97.4 F

## 2020-10-20 NOTE — COUNSELING
[Non - Smoker] : non-smoker [Smoke/CO Detectors] : smoke/CO detectors [] : eye exam [Completed] : Aspirin use discussion completed [Decrease stress] : decrease stress [Discussed disease trajectory with patient/caregiver] : discussed disease trajectory with patient/caregiver

## 2020-11-09 ENCOUNTER — NON-APPOINTMENT (OUTPATIENT)
Age: 60
End: 2020-11-09

## 2020-11-09 NOTE — HEALTH RISK ASSESSMENT
[HRA Reviewed] : Health risk assessment reviewed [Independent] : managing finances [Some assistance needed] : using transportation [No falls in past year] : Patient reported no falls in the past year [Yes] : The patient does have visual impairment [TimeGetUpGo] : 10

## 2020-11-09 NOTE — HISTORY OF PRESENT ILLNESS
[Patient] : patient [FreeTextEntry2] : 59 y.o. male lives in [facility] for past  years. Pt is alert and oriented to time place, person and situation. Reports he has COPD, HTN, DM, High Cholesterol, unsure how pt is taking his medications. \par \par He states he is overwhelmed about the number of medications. Reviewed all medications he takes 5 pills 2 pumps 1 - daily and 1 prn. Says he now has an aide who is assisting him with the medications. \par \par He reports still not able to sleep, states he is taking 1200 mg of Seroquel, Trazodone 200 mg and Ambien 10 mg.  - psych notes states 600 mg, emailed MD to confirm dose.  \par  Discussed the relationship between dental caries and heart disease. He wants to follow-up with the dentist number for [facility] [address] [phone], and [facility] [address] [phone]. \par \par He now has an aide who is assisting him with medication, household activities, maybe able to go to f/u appointments with him.  States he won a lottery he applied for a few years ago, to get an aide. \par \par Pt saw Dr. Pendleton last year NSR with Sinus Tach, chest pain thought to be pleuritic, recommend pt f/u with pulmonary. Pt to make appt with his pulmonologist.\par   States he has stopped drinking and decreased the number of cigarettes.

## 2020-11-09 NOTE — PHYSICAL EXAM
[No Acute Distress] : no acute distress [EOMI] : extra ocular movement intact [Normal Outer Ear/Nose] : the ears and nose were normal in appearance [Supple] : the neck was supple [No Respiratory Distress] : no respiratory distress [No Accessory Muscle Use] : no accessory muscle use [Normal Rate] : heart rate was normal  [Normal S1, S2] : normal S1 and S2 [No Murmurs] : no murmurs heard [No Edema] : there was no peripheral edema [Normal Bowel Sounds] : normal bowel sounds [Non Tender] : non-tender [Soft] : abdomen soft [Patient Refused] : rectal exam was refused by the patient [No CVA Tenderness] : no ~M costovertebral angle tenderness [No Skin Lesions] : no skin lesions [No Rash] : no rash [Cranial Nerves Intact] : cranial nerves 2-12 were intact [Oriented x3] : oriented to person, place, and time [Normal Affect] : the affect was normal [Normal Mood] : the mood was normal [Over the Past 2 Weeks, Have You Felt Down, Depressed, or Hopeless?] : 1.) Over the past 2 weeks, have you felt down, depressed, or hopeless? Yes [Over the Past 2 Weeks, Have You Felt Little Interest or Pleasure Doing Things?] : 2.) Over the past 2 weeks, have you felt little interest or pleasure doing things? No [Foot Ulcers] : no foot ulcers [FreeTextEntry1] : colonoscopy recommended

## 2020-11-09 NOTE — CURRENT MEDS
[Medication and Allergies Reconciled] : medication and allergies reconciled [High Risk Medications Reviewed and Reconciled (Beers Criteria)] : high risk medications reviewed and reconciled [Reviewed patient reported medication adherence from Comprehensive Assessment] : Reviewed patient reported medication adherence from comprehensive assessment [de-identified] : Unsure how adherent pt is to medications, states aide is now assisting him

## 2020-11-09 NOTE — REASON FOR VISIT
[Initial Annual Medicare Wellness Visit] : an initial annual Medicare wellness visit [Pre-Visit Preparation] : pre-visit preparation was done [FreeTextEntry2] : notes, meds, labs reviewed

## 2020-11-16 ENCOUNTER — APPOINTMENT (OUTPATIENT)
Dept: HOME HEALTH SERVICES | Facility: HOME HEALTH | Age: 60
End: 2020-11-16
Payer: MEDICARE

## 2020-11-16 VITALS
HEART RATE: 102 BPM | RESPIRATION RATE: 20 BRPM | TEMPERATURE: 99.2 F | DIASTOLIC BLOOD PRESSURE: 98 MMHG | SYSTOLIC BLOOD PRESSURE: 140 MMHG

## 2020-11-16 DIAGNOSIS — Z11.59 ENCOUNTER FOR SCREENING FOR OTHER VIRAL DISEASES: ICD-10-CM

## 2020-11-16 PROCEDURE — 99350 HOME/RES VST EST HIGH MDM 60: CPT

## 2020-11-16 NOTE — HEALTH RISK ASSESSMENT
[HRA Reviewed] : Health risk assessment reviewed [Independent] : managing finances [Some assistance needed] : using transportation [No falls in past year] : Patient reported no falls in the past year

## 2020-11-17 RX ORDER — ACETAMINOPHEN 500 MG/1
500 TABLET, COATED ORAL
Qty: 60 | Refills: 4 | Status: DISCONTINUED | COMMUNITY
Start: 2019-09-26 | End: 2020-11-17

## 2020-11-17 RX ORDER — CEFPODOXIME PROXETIL 100 MG/1
100 TABLET, FILM COATED ORAL
Qty: 1 | Refills: 0 | Status: DISCONTINUED | COMMUNITY
Start: 2019-09-01 | End: 2020-11-17

## 2020-11-17 RX ORDER — DOXYCYCLINE 100 MG/1
100 CAPSULE ORAL TWICE DAILY
Qty: 14 | Refills: 0 | Status: DISCONTINUED | COMMUNITY
Start: 2019-09-01 | End: 2020-11-17

## 2020-11-17 RX ORDER — AMOXICILLIN AND CLAVULANATE POTASSIUM 875; 125 MG/1; MG/1
875-125 TABLET, COATED ORAL
Qty: 20 | Refills: 0 | Status: DISCONTINUED | COMMUNITY
Start: 2020-02-06 | End: 2020-11-17

## 2020-11-17 RX ORDER — CEFPODOXIME PROXETIL 200 MG/1
200 TABLET, FILM COATED ORAL
Qty: 10 | Refills: 0 | Status: DISCONTINUED | COMMUNITY
Start: 2019-08-12 | End: 2020-11-17

## 2020-11-17 RX ORDER — DOXYCYCLINE HYCLATE 100 MG/1
100 CAPSULE ORAL
Qty: 28 | Refills: 0 | Status: DISCONTINUED | COMMUNITY
Start: 2019-08-12 | End: 2020-11-17

## 2020-11-17 RX ORDER — NALTREXONE HYDROCHLORIDE 50 MG/1
50 TABLET, FILM COATED ORAL
Qty: 30 | Refills: 0 | Status: ACTIVE | COMMUNITY
Start: 2019-09-30

## 2020-11-17 NOTE — CHRONIC CARE ASSESSMENT
[Patient Non-adherent to care plan] : patient non-adherent to care plan [Less than 3 Times Per Week] : exercises less than 3 times per week [Low Salt Diet] : low salt [General Adherence] : and is generally adherent

## 2020-11-17 NOTE — HISTORY OF PRESENT ILLNESS
[Patient] : patient [FreeTextEntry1] : gait and balance  [FreeTextEntry2] : 59 y.o. male lives in [facility] for past  years. Pt is alert and oriented to time place, person and situation. Reports he has COPD, HTN, DM, High Cholesterol, unsure how pt is taking his medications. \par \par Pt being seen for report by JONI Clement that pt had tremors. Pt has had left hand tremors. He states he is short of breath most of the time. Does not take medication, states he only have Albuterol, despite multiple prescriptions sent for Symbicort. Given number to pulmonary and [facility] for follow-up, referral given. \par \par He reports still not able to sleep, states he is taking 1200 mg of Seroquel, Trazodone 200 mg and Ambien 10 mg.  - psych notes states 600 mg.  Explored his sleep habits -to turn the tv off at a reasonable hour, now goes to bed at 4 AM, to try meditation or tea, anything to help him to calm down. \par  \par  Pt to make appt with his pulmonologist.\par   States he has stopped drinking and decreased the number of cigarettes.

## 2020-11-17 NOTE — PHYSICAL EXAM
[No Acute Distress] : no acute distress [EOMI] : extra ocular movement intact [Normal Outer Ear/Nose] : the ears and nose were normal in appearance [Supple] : the neck was supple [No Respiratory Distress] : no respiratory distress [No Accessory Muscle Use] : no accessory muscle use [Normal Rate] : heart rate was normal  [Normal S1, S2] : normal S1 and S2 [No Murmurs] : no murmurs heard [No Edema] : there was no peripheral edema [Normal Bowel Sounds] : normal bowel sounds [Non Tender] : non-tender [Soft] : abdomen soft [Patient Refused] : rectal exam was refused by the patient [No CVA Tenderness] : no ~M costovertebral angle tenderness [No Rash] : no rash [No Skin Lesions] : no skin lesions [Cranial Nerves Intact] : cranial nerves 2-12 were intact [Oriented x3] : oriented to person, place, and time [Normal Affect] : the affect was normal [Normal Mood] : the mood was normal [Over the Past 2 Weeks, Have You Felt Down, Depressed, or Hopeless?] : 1.) Over the past 2 weeks, have you felt down, depressed, or hopeless? Yes [Over the Past 2 Weeks, Have You Felt Little Interest or Pleasure Doing Things?] : 2.) Over the past 2 weeks, have you felt little interest or pleasure doing things? No [Foot Ulcers] : no foot ulcers [FreeTextEntry1] : colonoscopy recommended

## 2020-11-17 NOTE — COUNSELING
[TLC diet recommended] : TLC diet recommended [Smoker, not interested in quitting] : smoker, not interested in quitting [] : foot exam [Completed] : Aspirin use discussion completed [Improve mobility] : improve mobility [Improve pain control] : improve pain control [Decrease stress] : decrease stress [Discussed disease trajectory with patient/caregiver] : discussed disease trajectory with patient/caregiver [Likely to achieve goals/desired outcomes] : likely to achieve goals/desired outcomes [DNR] : Code Status: DNR [[facility] Date: _____[facility]last [facility]: [unfilled] [FreeTextEntry4] : Pulmonology for PFT test; Sleep study for sleep apnea

## 2020-11-17 NOTE — REASON FOR VISIT
[Subsequent Annual Medicare Wellness Visit] : a subsequent annual Medicare wellness visit [Other: _____] : [unfilled] [Pre-Visit Preparation] : pre-visit preparation was done [FreeTextEntry2] : meds, notes, labs reviewed

## 2020-11-18 LAB
SARS-COV-2 IGG SERPL IA-ACNC: <0.1 INDEX
SARS-COV-2 IGG SERPL QL IA: NEGATIVE
T4 SERPL-MCNC: 6.7 UG/DL

## 2020-12-07 ENCOUNTER — APPOINTMENT (OUTPATIENT)
Dept: PULMONOLOGY | Facility: CLINIC | Age: 60
End: 2020-12-07

## 2020-12-10 ENCOUNTER — APPOINTMENT (OUTPATIENT)
Dept: PULMONOLOGY | Facility: CLINIC | Age: 60
End: 2020-12-10

## 2020-12-10 DIAGNOSIS — Z20.828 CONTACT WITH AND (SUSPECTED) EXPOSURE TO OTHER VIRAL COMMUNICABLE DISEASES: ICD-10-CM

## 2020-12-21 ENCOUNTER — APPOINTMENT (OUTPATIENT)
Dept: HOME HEALTH SERVICES | Facility: HOME HEALTH | Age: 60
End: 2020-12-21

## 2021-01-06 ENCOUNTER — APPOINTMENT (OUTPATIENT)
Dept: HOME HEALTH SERVICES | Facility: HOME HEALTH | Age: 61
End: 2021-01-06
Payer: MEDICARE

## 2021-01-06 PROCEDURE — 99349 HOME/RES VST EST MOD MDM 40: CPT

## 2021-01-08 VITALS
HEART RATE: 111 BPM | DIASTOLIC BLOOD PRESSURE: 90 MMHG | TEMPERATURE: 97.7 F | OXYGEN SATURATION: 90 % | RESPIRATION RATE: 20 BRPM | SYSTOLIC BLOOD PRESSURE: 140 MMHG

## 2021-01-08 RX ORDER — HYDROCHLOROTHIAZIDE 12.5 MG/1
12.5 CAPSULE ORAL DAILY
Qty: 30 | Refills: 4 | Status: ACTIVE | COMMUNITY
Start: 2019-09-28 | End: 1900-01-01

## 2021-01-08 NOTE — REASON FOR VISIT
[Follow-Up] : a follow-up visit [Pre-Visit Preparation] : pre-visit preparation was done [FreeTextEntry2] : meds, labs, notes reviewed

## 2021-01-08 NOTE — HISTORY OF PRESENT ILLNESS
[Patient] : patient [FreeTextEntry1] : gait and balance  [FreeTextEntry2] : 59 y.o. male lives in [facility] for past  years. Pt is alert and oriented to time place, person and situation. Dx of  COPD, HTN, DM, High Cholesterol. States he does not take his medications as prescribed.  \par \par  During this visit pulse 111 b/m, lungs clear, pt did not keep appointment with cardiology or pulmonary. Last EKG, ECHO was 10/2019. He will need a repeat, he saw Dr. Pendleton, notes show he recommend f/u with pulm for COPD WHO grade 3, secondary to cigarette smoking. \par \par He reports still not able to sleep, states he is taking 1200 mg of Seroquel, Trazodone 200 mg and Ambien 10 mg.  - psych notes states 600 mg of Seroquel.  He has to f/u with Dr. Crocker.He is very upset that the kitchen has to be pre-ordered so only 1 person is in at a time, no visitors allowed. 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  \par \par He  Explored his sleep habits -to turn the tv off at a reasonable hour, now goes to bed at 4 AM, to try meditation or tea, anything to help him to calm down. \par  \par  Pt to make appt with his pulmonologist.   States he has stopped drinking and decreased the number of cigarettes.

## 2021-01-08 NOTE — HEALTH RISK ASSESSMENT
[HRA Reviewed] : Health risk assessment reviewed [Independent] : managing finances [No falls in past year] : Patient reported no falls in the past year [Yes] : The patient does have visual impairment [TimeGetUpGo] : 10 [de-identified] : ambulates independently

## 2021-01-08 NOTE — PHYSICAL EXAM
[No Acute Distress] : no acute distress [EOMI] : extra ocular movement intact [Normal Outer Ear/Nose] : the ears and nose were normal in appearance [Supple] : the neck was supple [No Respiratory Distress] : no respiratory distress [No Accessory Muscle Use] : no accessory muscle use [Normal Rate] : heart rate was normal  [No Murmurs] : no murmurs heard [No Edema] : there was no peripheral edema [Normal Bowel Sounds] : normal bowel sounds [Non Tender] : non-tender [Soft] : abdomen soft [Patient Refused] : rectal exam was refused by the patient [No CVA Tenderness] : no ~M costovertebral angle tenderness [No Rash] : no rash [No Skin Lesions] : no skin lesions [Cranial Nerves Intact] : cranial nerves 2-12 were intact [Oriented x3] : oriented to person, place, and time [Normal Affect] : the affect was normal [Normal Mood] : the mood was normal [Over the Past 2 Weeks, Have You Felt Down, Depressed, or Hopeless?] : 1.) Over the past 2 weeks, have you felt down, depressed, or hopeless? Yes [Over the Past 2 Weeks, Have You Felt Little Interest or Pleasure Doing Things?] : 2.) Over the past 2 weeks, have you felt little interest or pleasure doing things? No [Foot Ulcers] : no foot ulcers [FreeTextEntry1] : colonoscopy recommended

## 2021-02-19 ENCOUNTER — APPOINTMENT (OUTPATIENT)
Dept: HOME HEALTH SERVICES | Facility: HOME HEALTH | Age: 61
End: 2021-02-19
Payer: MEDICARE

## 2021-02-19 VITALS
RESPIRATION RATE: 20 BRPM | HEART RATE: 117 BPM | TEMPERATURE: 97.8 F | OXYGEN SATURATION: 93 % | SYSTOLIC BLOOD PRESSURE: 100 MMHG | DIASTOLIC BLOOD PRESSURE: 80 MMHG

## 2021-02-19 DIAGNOSIS — Z92.29 PERSONAL HISTORY OF OTHER DRUG THERAPY: ICD-10-CM

## 2021-02-19 PROCEDURE — 93000 ELECTROCARDIOGRAM COMPLETE: CPT

## 2021-02-19 PROCEDURE — 99350 HOME/RES VST EST HIGH MDM 60: CPT

## 2021-02-19 NOTE — COUNSELING
[Overweight - ( BMI 25.1 - 29.9 )] : overweight -  ( BMI 25.1 - 29.9 ) [TLC diet recommended] : TLC diet recommended

## 2021-02-21 NOTE — PHYSICAL EXAM
[No Acute Distress] : no acute distress [EOMI] : extra ocular movement intact [Normal Outer Ear/Nose] : the ears and nose were normal in appearance [Supple] : the neck was supple [No Respiratory Distress] : no respiratory distress [No Accessory Muscle Use] : no accessory muscle use [Normal Rate] : heart rate was normal  [No Murmurs] : no murmurs heard [No Edema] : there was no peripheral edema [Normal Bowel Sounds] : normal bowel sounds [Non Tender] : non-tender [Soft] : abdomen soft [Patient Refused] : rectal exam was refused by the patient [No CVA Tenderness] : no ~M costovertebral angle tenderness [No Rash] : no rash [No Skin Lesions] : no skin lesions [Cranial Nerves Intact] : cranial nerves 2-12 were intact [Oriented x3] : oriented to person, place, and time [Normal Affect] : the affect was normal [Normal Mood] : the mood was normal [Over the Past 2 Weeks, Have You Felt Down, Depressed, or Hopeless?] : 1.) Over the past 2 weeks, have you felt down, depressed, or hopeless? Yes [Foot Ulcers] : no foot ulcers [Over the Past 2 Weeks, Have You Felt Little Interest or Pleasure Doing Things?] : 2.) Over the past 2 weeks, have you felt little interest or pleasure doing things? No [FreeTextEntry1] : colonoscopy recommended

## 2021-02-21 NOTE — HISTORY OF PRESENT ILLNESS
[Patient] : patient [FreeTextEntry1] : gait and balance  [FreeTextEntry2] : 59 y.o. male lives in [facility] for past  years. Pt is alert and oriented to time place, person and situation. Dx of  COPD, HTN, DM, High Cholesterol. Has hx of MI 4/4/2011. States he does not take his medications as prescribed.  \par \par  During this visit pulse 111 b/m, lungs clear, pt did not keep appointment with cardiology or pulmonary. Last EKG, ECHO was 10/2019. He will need a repeat, he saw Dr. Pendleton, notes show he recommend f/u with pulm for COPD WHO grade 3, secondary to cigarette smoking. \par \par He reports still not able to sleep, states he is taking 1200 mg of Seroquel, Trazodone 200 mg and Ambien 10 mg.  - psych notes states 600 mg of Seroquel.  He has to f/u with Dr. Crocker. Did COVID-19 test last week, negative results. Pt has not kept any appointments with cardiology or pulmonologist. Discussed with him need to f/u for testing. \par \par   Explored his sleep habits -to turn the tv off at a reasonable hour, now goes to bed at 4 AM, to try meditation or tea, anything to help him to calm down. He reports compliance with psych medications Seroquel, Zolpidem and Trazodone.\par   Pt to make appt with his pulmonologist.   States he has stopped drinking and decreased the number of cigarettes.

## 2021-02-21 NOTE — HEALTH RISK ASSESSMENT
[HRA Reviewed] : Health risk assessment reviewed [Independent] : managing finances [No falls in past year] : Patient reported no falls in the past year [Yes] : The patient does have visual impairment [TimeGetUpGo] : 10 [de-identified] : ambulate independently

## 2021-02-21 NOTE — REVIEW OF SYSTEMS
(0) independent [Fever] : no fever [Chills] : no chills [Fatigue] : no fatigue [FreeTextEntry2] : 59 o male awake and alert, mild facial swelling

## 2021-03-16 ENCOUNTER — NON-APPOINTMENT (OUTPATIENT)
Age: 61
End: 2021-03-16

## 2021-04-08 ENCOUNTER — APPOINTMENT (OUTPATIENT)
Dept: HOME HEALTH SERVICES | Facility: HOME HEALTH | Age: 61
End: 2021-04-08

## 2021-04-15 ENCOUNTER — RESULT CHARGE (OUTPATIENT)
Age: 61
End: 2021-04-15

## 2021-04-15 ENCOUNTER — APPOINTMENT (OUTPATIENT)
Dept: HOME HEALTH SERVICES | Facility: HOME HEALTH | Age: 61
End: 2021-04-15
Payer: MEDICARE

## 2021-04-15 VITALS
DIASTOLIC BLOOD PRESSURE: 70 MMHG | HEART RATE: 85 BPM | SYSTOLIC BLOOD PRESSURE: 100 MMHG | TEMPERATURE: 97.8 F | RESPIRATION RATE: 20 BRPM | OXYGEN SATURATION: 96 %

## 2021-04-15 DIAGNOSIS — L03.211 CELLULITIS OF FACE: ICD-10-CM

## 2021-04-15 DIAGNOSIS — R79.89 OTHER SPECIFIED ABNORMAL FINDINGS OF BLOOD CHEMISTRY: ICD-10-CM

## 2021-04-15 DIAGNOSIS — Z11.59 ENCOUNTER FOR SCREENING FOR OTHER VIRAL DISEASES: ICD-10-CM

## 2021-04-15 DIAGNOSIS — L02.01 CELLULITIS OF FACE: ICD-10-CM

## 2021-04-15 PROCEDURE — 99350 HOME/RES VST EST HIGH MDM 60: CPT

## 2021-04-16 PROBLEM — R79.89 ELEVATED TSH: Status: RESOLVED | Noted: 2019-09-02 | Resolved: 2021-04-16

## 2021-04-16 NOTE — HISTORY OF PRESENT ILLNESS
[Patient] : patient [FreeTextEntry1] : gait and balance  [FreeTextEntry2] : 59 y.o. male lives in [facility] for past  years. Pt is alert and oriented to time place, person and situation. Dx of  COPD, HTN, DM, High Cholesterol. Has hx of MI 4/4/2011. States he does not take his medications as prescribed, has a co payment with medications.  \par \par Being seen for medical f/u. Pt is accompanied by his SW Rosemary LEMA. \par Reports he is feeling weaker, needs an aide to clean his room. Instructed him to call his insurance to find out the procedure. Presently pt is awake, alert and oriented x 4. Able to make decisions. Very non compliant with medical follow up with specialists and with medications. \par Blaming behavior, states "Everybody thinks its easy to me, but they don't understand that I have to drag myself out of bed. I cant have my dgtr going to appointment with me. I need someone to make the appointments and go with me." SW will meet with pt to call his insurance [employer] and to make appointments for him and discuss who will accompany him. \par  During this visit pulse 85 b/m, lungs clear, pt did not keep appointment with cardiology or pulmonary.Denies CP, SOB, has trace wheezing, no changes in appetite, bowel movements. Last EKG, ECHO was 10/2019. He will need a repeat, and f/u labs, scheduled for next Tuesday.\par \par He reports still not able to sleep, states he is taking 1200 mg of Seroquel, Trazodone 200 mg and Ambien 10 mg.  - psych notes states 600 mg of Seroquel.  Only taking psych medications. \par \par      States he has decreased drinking and decreased the number of cigarettes. Discussed stopping or cutting down. Pt received COVID-19 vaccine, Moderna 2/26 and 3/21 no side effects experienced.

## 2021-04-16 NOTE — COUNSELING
[TLC diet recommended] : TLC diet recommended [Smoker, not interested in quitting] : smoker, not interested in quitting [Smoke/CO Detectors] : smoke/CO detectors [] : foot exam [Completed] : Aspirin use discussion completed

## 2021-04-16 NOTE — HEALTH RISK ASSESSMENT
[No falls in past year] : Patient reported no falls in the past year [Yes] : The patient does have visual impairment [HRA Reviewed] : Health risk assessment reviewed [Independent] : managing finances [TimeGetUpGo] : 10 [de-identified] : ambulate independently

## 2021-04-20 ENCOUNTER — APPOINTMENT (OUTPATIENT)
Dept: PULMONOLOGY | Facility: CLINIC | Age: 61
End: 2021-04-20
Payer: MEDICARE

## 2021-04-20 VITALS
TEMPERATURE: 97.3 F | SYSTOLIC BLOOD PRESSURE: 154 MMHG | DIASTOLIC BLOOD PRESSURE: 92 MMHG | HEART RATE: 102 BPM | HEIGHT: 67 IN | WEIGHT: 154 LBS | BODY MASS INDEX: 24.17 KG/M2 | OXYGEN SATURATION: 96 %

## 2021-04-20 PROCEDURE — 99406 BEHAV CHNG SMOKING 3-10 MIN: CPT

## 2021-04-20 PROCEDURE — 99204 OFFICE O/P NEW MOD 45 MIN: CPT

## 2021-04-20 PROCEDURE — 99072 ADDL SUPL MATRL&STAF TM PHE: CPT

## 2021-04-20 RX ORDER — ALBUTEROL SULFATE 2.5 MG/3ML
(2.5 MG/3ML) SOLUTION RESPIRATORY (INHALATION)
Qty: 0 | Refills: 0 | Status: COMPLETED | OUTPATIENT
Start: 2021-04-20

## 2021-04-20 NOTE — COUNSELING
[Risk of tobacco use and health benefits of smoking cessation discussed] : Risk of tobacco use and health benefits of smoking cessation discussed [Cessation strategies including cessation program discussed] : Cessation strategies including cessation program discussed [Encouraged to pick a quit date and identify support needed to quit] : Encouraged to pick a quit date and identify support needed to quit [Willing to Quit Smoking] : Willing to quit smoking [Tobacco Use Cessation Intermediate Greater Than 3 Minutes Up to 10 Minutes] : Tobacco Use Cessation Intermediate Greater Than 3 Minutes Up to 10 Minutes [FreeTextEntry1] : 8

## 2021-04-20 NOTE — HISTORY OF PRESENT ILLNESS
[TextBox_4] : 60 year old male, current smoker (40 pack year of smoking) with history of COPD (not on inhalers for many years). He is c/o SOB on exertion while walking from his bedroom to bathroom. Denies hospitalization. He says that he is not complaint with medication and inhalers. Denies cough, expectoration, chest pain, pedal edema or weight loss or hemoptysis.

## 2021-04-20 NOTE — PHYSICAL EXAM
[No Acute Distress] : no acute distress [Well Nourished] : well nourished [Normal Oropharynx] : normal oropharynx [Normal Appearance] : normal appearance [No JVD] : no jvd [Normal Rate/Rhythm] : normal rate/rhythm [Normal S1, S2] : normal s1, s2 [No Resp Distress] : no resp distress [No Acc Muscle Use] : no acc muscle use [Benign] : benign [Not Tender] : not tender [Normal Gait] : normal gait [No Clubbing] : no clubbing [No Edema] : no edema [Normal Color/ Pigmentation] : normal color/ pigmentation [No Rash] : no rash [No Focal Deficits] : no focal deficits [No Sensory Deficits] : no sensory deficits [Oriented x3] : oriented x3 [Normal Affect] : normal affect

## 2021-04-20 NOTE — DISCUSSION/SUMMARY
[FreeTextEntry1] : 60 year old male, current smoker (40 pack year of smoking) with history of COPD (not on inhalers for many years), non complaint with inhalers.\par \par Review:\par Labs: No eosinophilia\par CXR (2017): Emphysema\par \par A/P\par COPD:\par - PFT with DLCO, 6 minute wlak test\par - CXR \par - Anoro 1 puff daily. Video shown teaching done. Albuterol nebs were given today\par - Prn albuterol\par - Written details about plan was given to patient.\par

## 2021-04-20 NOTE — REVIEW OF SYSTEMS
[Fever] : no fever [Chills] : no chills [Dry Eyes] : no dry eyes [Eye Irritation] : no eye irritation [Cough] : no cough [Sputum] : no sputum [Dyspnea] : dyspnea [SOB on Exertion] : sob on exertion [Chest Discomfort] : no chest discomfort [Orthopnea] : no orthopnea [Hay Fever] : no hay fever [Nasal Discharge] : no nasal discharge [GERD] : no gerd [Diarrhea] : no diarrhea [TextBox_148] : rest of ROS negative except in HPI

## 2021-04-27 ENCOUNTER — APPOINTMENT (OUTPATIENT)
Dept: PULMONOLOGY | Facility: CLINIC | Age: 61
End: 2021-04-27
Payer: MEDICARE

## 2021-04-27 ENCOUNTER — OUTPATIENT (OUTPATIENT)
Dept: OUTPATIENT SERVICES | Facility: HOSPITAL | Age: 61
LOS: 1 days | End: 2021-04-27

## 2021-04-27 ENCOUNTER — APPOINTMENT (OUTPATIENT)
Dept: RADIOLOGY | Facility: CLINIC | Age: 61
End: 2021-04-27
Payer: MEDICARE

## 2021-04-27 ENCOUNTER — RESULT REVIEW (OUTPATIENT)
Age: 61
End: 2021-04-27

## 2021-04-27 VITALS
OXYGEN SATURATION: 98 % | DIASTOLIC BLOOD PRESSURE: 96 MMHG | WEIGHT: 154 LBS | HEART RATE: 106 BPM | HEIGHT: 67 IN | BODY MASS INDEX: 24.17 KG/M2 | SYSTOLIC BLOOD PRESSURE: 138 MMHG | TEMPERATURE: 98.2 F

## 2021-04-27 PROCEDURE — 99214 OFFICE O/P EST MOD 30 MIN: CPT | Mod: 25

## 2021-04-27 PROCEDURE — 94726 PLETHYSMOGRAPHY LUNG VOLUMES: CPT

## 2021-04-27 PROCEDURE — 94618 PULMONARY STRESS TESTING: CPT

## 2021-04-27 PROCEDURE — 99072 ADDL SUPL MATRL&STAF TM PHE: CPT

## 2021-04-27 PROCEDURE — 71046 X-RAY EXAM CHEST 2 VIEWS: CPT | Mod: 26

## 2021-04-27 PROCEDURE — 94060 EVALUATION OF WHEEZING: CPT

## 2021-04-27 PROCEDURE — 94729 DIFFUSING CAPACITY: CPT

## 2021-04-27 NOTE — HISTORY OF PRESENT ILLNESS
[TextBox_4] : 60 year old male, current smoker (40 pack year of smoking) with history of COPD (not on inhalers for many years). He is c/o SOB on exertion while walking from his bedroom to bathroom. Denies hospitalization. He says that he is not complaint with medication and inhalers. Denies cough, expectoration, chest pain, pedal edema or weight loss or hemoptysis. \par \par 4/27/21:\par PFT was done. Patient has used samples of Anoro but has not gone to pharmacy to  his prescription yet. PFT and 6 minute walk test was done today along with CXR.

## 2021-04-27 NOTE — REVIEW OF SYSTEMS
[Fatigue] : fatigue [Dyspnea] : dyspnea [SOB on Exertion] : sob on exertion [Fever] : no fever [Chills] : no chills [Dry Eyes] : no dry eyes [Eye Irritation] : no eye irritation [Cough] : no cough [Sputum] : no sputum [Chest Discomfort] : no chest discomfort [Orthopnea] : no orthopnea [Hay Fever] : no hay fever [Nasal Discharge] : no nasal discharge [GERD] : no gerd [Diarrhea] : no diarrhea [TextBox_148] : rest of ROS negative except in HPI

## 2021-04-27 NOTE — PHYSICAL EXAM
[No Acute Distress] : no acute distress [Well Nourished] : well nourished [Normal Oropharynx] : normal oropharynx [Normal Appearance] : normal appearance [No JVD] : no jvd [Normal Rate/Rhythm] : normal rate/rhythm [Normal S1, S2] : normal s1, s2 [No Resp Distress] : no resp distress [No Acc Muscle Use] : no acc muscle use [Benign] : benign [Not Tender] : not tender [Normal Gait] : normal gait [No Clubbing] : no clubbing [No Cyanosis] : no cyanosis [No Edema] : no edema [Normal Color/ Pigmentation] : normal color/ pigmentation [No Rash] : no rash [No Focal Deficits] : no focal deficits [No Sensory Deficits] : no sensory deficits [Oriented x3] : oriented x3 [Normal Affect] : normal affect

## 2021-04-27 NOTE — DISCUSSION/SUMMARY
[FreeTextEntry1] : 60 year old male, current smoker (40 pack year of smoking) with COPD.\par \par Review:\par PFT (4/21): FEV1 49, FEV1/FVC 61, TLC 97, DLCO 36%, Rev 19%.\par 6 minute walk test: 98% on RA with exertion\par Labs: No eosinophilia\par CXR (4/21): Emphsyema\par CXR (2017): Emphysema\par \par A/P\par COPD:\par - PFT showed severe obstruction with significant reversibility\par - d/c Anoro, change to Breo 1 puff daily with gargle\par - Prn albuterol\par - Follow up in 1 month\par \par Lung cancer screening:\par - 40 pack year smoking, current smoker\par - Low dose CT chest\par

## 2021-04-29 LAB
ALBUMIN SERPL ELPH-MCNC: 4.5 G/DL
ALP BLD-CCNC: 198 U/L
ALT SERPL-CCNC: 56 U/L
ANION GAP SERPL CALC-SCNC: 17 MMOL/L
AST SERPL-CCNC: 63 U/L
BASOPHILS # BLD AUTO: 0.08 K/UL
BASOPHILS NFR BLD AUTO: 1.9 %
BILIRUB SERPL-MCNC: 0.3 MG/DL
BUN SERPL-MCNC: 9 MG/DL
CALCIUM SERPL-MCNC: 10.1 MG/DL
CHLORIDE SERPL-SCNC: 100 MMOL/L
CHOLEST SERPL-MCNC: 155 MG/DL
CO2 SERPL-SCNC: 25 MMOL/L
CREAT SERPL-MCNC: 1.02 MG/DL
EOSINOPHIL # BLD AUTO: 0.21 K/UL
EOSINOPHIL NFR BLD AUTO: 4.9 %
FOLATE SERPL-MCNC: 3.4 NG/ML
GLUCOSE SERPL-MCNC: 88 MG/DL
HCT VFR BLD CALC: 36.5 %
HDLC SERPL-MCNC: 56 MG/DL
HGB BLD-MCNC: 12.7 G/DL
IMM GRANULOCYTES NFR BLD AUTO: 0.2 %
LDLC SERPL CALC-MCNC: NORMAL MG/DL
LYMPHOCYTES # BLD AUTO: 1.7 K/UL
LYMPHOCYTES NFR BLD AUTO: 39.6 %
MAN DIFF?: NORMAL
MCHC RBC-ENTMCNC: 34.6 PG
MCHC RBC-ENTMCNC: 34.8 GM/DL
MCV RBC AUTO: 99.5 FL
MONOCYTES # BLD AUTO: 0.52 K/UL
MONOCYTES NFR BLD AUTO: 12.1 %
NEUTROPHILS # BLD AUTO: 1.77 K/UL
NEUTROPHILS NFR BLD AUTO: 41.3 %
NONHDLC SERPL-MCNC: 99 MG/DL
NT-PROBNP SERPL-MCNC: 38 PG/ML
PLATELET # BLD AUTO: 265 K/UL
POTASSIUM SERPL-SCNC: 4.1 MMOL/L
PROT SERPL-MCNC: 7.4 G/DL
RBC # BLD: 3.67 M/UL
RBC # FLD: 14.3 %
SODIUM SERPL-SCNC: 143 MMOL/L
T4 FREE SERPL-MCNC: 1.1 NG/DL
TRIGL SERPL-MCNC: 556 MG/DL
TSH SERPL-ACNC: 0.59 UIU/ML
VIT B12 SERPL-MCNC: 535 PG/ML
WBC # FLD AUTO: 4.29 K/UL

## 2021-05-04 ENCOUNTER — APPOINTMENT (OUTPATIENT)
Dept: PULMONOLOGY | Facility: CLINIC | Age: 61
End: 2021-05-04
Payer: MEDICARE

## 2021-05-04 VITALS — WEIGHT: 154 LBS | HEIGHT: 67 IN | BODY MASS INDEX: 24.17 KG/M2

## 2021-05-04 PROCEDURE — G0296 VISIT TO DETERM LDCT ELIG: CPT

## 2021-05-04 PROCEDURE — 99072 ADDL SUPL MATRL&STAF TM PHE: CPT

## 2021-05-04 NOTE — REASON FOR VISIT
[Home] : at home, [unfilled] , at the time of the visit. [Medical Office: ([facility])___] : at the medical office located in  [Verbal consent obtained from patient] : the patient, [unfilled] [Initial Evaluation] : an initial evaluation visit [Review of Eligibility] : review of eligibility [Low-Dose CT Screening Discussion] : low-dose CT lung cancer screening discussion

## 2021-05-04 NOTE — HISTORY OF PRESENT ILLNESS
[Current] : current smoker [_____ pack-years] : [unfilled] pack-years [TextBox_13] : Referred by Dr. Roger\par \par Mr. GLADYS FELICIANO  is a 60 year old man with a history of COPD and CAD\par \par He  was seen in the office by Dr. Roger  for review of eligibility for, as well as, discussion of Low-Dose CT lung cancer screening program. Over the telephone today we reviewed and confirmed that the patient meets screening eligibility criteria:\par -Age: 60 year \par Smoking status:\par -Current smoker\par -Number of pack(s) per day:  1\par -Number of years smoked: \par -Number of pack years smokin\par \par \par Mr. FELICIANO denies any signs or symptoms of lung cancer including new cough, change in cough, hemoptysis and unintentional weight loss. \par \par Mr. FELICIANO denies any personal history of lung cancer. No lung cancer in a 1st degree relative. History of lung disease: COPD. Denies any history of occupational exposures. \par \par  [TextBox_6] : 1 [TextBox_8] : 47

## 2021-05-04 NOTE — ASSESSMENT
[Discussed Risks and Advised to Quit Smoking] : Discussed risks and advised to quit smoking [Not Ready] : Patient is not ready for cessation intervention [Pre-contemplation] : Pre-contemplation: The patient is not thinking about quitting smoking in the next 6 months

## 2021-05-04 NOTE — PLAN
[Smoking Cessation Guidance Provided] : Smoking cessation guidance was provided to patient [Smoking Cessation] : smoking cessation [FreeTextEntry1] : Plan:\par -Low Dose CT chest for lung cancer screening\par -Follow up with patient and his referring provider after his LDCT results have been reviewed by the multi-disciplinary clinical team\par -Encouraged smoking cessation\par \par \par Should I Screen? tool utilized. 6 year risk of lung cancer is 6%. Patient wishes to proceed with screening.\par \par Engaged in shared decision making with Mr. GLADYS FELICIANO . Answered all questions. He verbalized understanding and agreement. He knows to call back with any questions or concerns

## 2021-05-06 ENCOUNTER — NON-APPOINTMENT (OUTPATIENT)
Age: 61
End: 2021-05-06

## 2021-05-13 ENCOUNTER — RESULT REVIEW (OUTPATIENT)
Age: 61
End: 2021-05-13

## 2021-05-13 ENCOUNTER — OUTPATIENT (OUTPATIENT)
Dept: OUTPATIENT SERVICES | Facility: HOSPITAL | Age: 61
LOS: 1 days | End: 2021-05-13
Payer: MEDICARE

## 2021-05-13 ENCOUNTER — APPOINTMENT (OUTPATIENT)
Dept: CT IMAGING | Facility: HOSPITAL | Age: 61
End: 2021-05-13

## 2021-05-13 PROCEDURE — 71271 CT THORAX LUNG CANCER SCR C-: CPT

## 2021-05-13 PROCEDURE — 71271 CT THORAX LUNG CANCER SCR C-: CPT | Mod: 26

## 2021-05-25 ENCOUNTER — APPOINTMENT (OUTPATIENT)
Dept: PULMONOLOGY | Facility: CLINIC | Age: 61
End: 2021-05-25
Payer: MEDICARE

## 2021-05-25 VITALS
HEIGHT: 67 IN | SYSTOLIC BLOOD PRESSURE: 138 MMHG | OXYGEN SATURATION: 97 % | DIASTOLIC BLOOD PRESSURE: 89 MMHG | HEART RATE: 99 BPM | TEMPERATURE: 98.7 F

## 2021-05-25 PROCEDURE — 99214 OFFICE O/P EST MOD 30 MIN: CPT

## 2021-05-25 NOTE — DISCUSSION/SUMMARY
[FreeTextEntry1] : 60 year old male, current smoker (40 pack year of smoking) with COPD.\par \par Review:\par PFT (4/21): FEV1 49, FEV1/FVC 61, TLC 97, DLCO 36%, Rev 19%.\par 6 minute walk test: 98% on RA with exertion\par Labs: No eosinophilia\par CXR (4/21): Emphsyema\par CXR (2017): Emphysema\par Low dose CT chest: Emphysema, no suspicious lung nodules, descending aorta aneurysm\par \par \par A/P\par COPD:\par - PFT showed severe obstruction with significant reversibility\par - Continue Breo 1 puff daily with gargle\par - Prn albuterol\par - Persistent SOB: Patient has history of CAD. Follow up with cardiology and PCP. Patient wants to talk to home call nurse Akila\par \par Lung cancer screening:\par - 40 pack year smoking, current smoker\par - Low dose CT chest done in May 2021\par - Repeat in May 2022\par

## 2021-05-25 NOTE — REVIEW OF SYSTEMS
[Fever] : no fever [Fatigue] : fatigue [Chills] : no chills [Dry Eyes] : no dry eyes [Eye Irritation] : no eye irritation [Cough] : no cough [Sputum] : no sputum [Dyspnea] : dyspnea [SOB on Exertion] : sob on exertion [Chest Discomfort] : no chest discomfort [Orthopnea] : no orthopnea [Hay Fever] : no hay fever [Nasal Discharge] : no nasal discharge [GERD] : no gerd [Diarrhea] : no diarrhea [TextBox_148] : rest of ROS negative except in HPI

## 2021-05-25 NOTE — REASON FOR VISIT
[Follow-Up] : a follow-up visit [COPD] : COPD [Shortness of Breath] : shortness of breath [Family Member] : family member

## 2021-05-25 NOTE — HISTORY OF PRESENT ILLNESS
[TextBox_4] : 60 year old male, current smoker (40 pack year of smoking) with history of COPD (not on inhalers for many years). He is c/o SOB on exertion while walking from his bedroom to bathroom. Denies hospitalization. He says that he is not complaint with medication and inhalers. Denies cough, expectoration, chest pain, pedal edema or weight loss or hemoptysis. \par \par 4/27/21:\par PFT was done. Patient has used samples of Anoro but has not gone to pharmacy to  his prescription yet. PFT and 6 minute walk test was done today along with CXR.\par \par 5/25/21"\par Patient continues to smoke. He is using Breo inhaler. He continues to complain of SOB.

## 2021-05-27 ENCOUNTER — NON-APPOINTMENT (OUTPATIENT)
Age: 61
End: 2021-05-27

## 2021-05-27 ENCOUNTER — APPOINTMENT (OUTPATIENT)
Dept: HOME HEALTH SERVICES | Facility: HOME HEALTH | Age: 61
End: 2021-05-27
Payer: MEDICARE

## 2021-05-27 VITALS
SYSTOLIC BLOOD PRESSURE: 118 MMHG | TEMPERATURE: 98.2 F | OXYGEN SATURATION: 94 % | RESPIRATION RATE: 20 BRPM | DIASTOLIC BLOOD PRESSURE: 70 MMHG | HEART RATE: 107 BPM

## 2021-05-27 DIAGNOSIS — D52.9 FOLATE DEFICIENCY ANEMIA, UNSPECIFIED: ICD-10-CM

## 2021-05-27 PROCEDURE — 99406 BEHAV CHNG SMOKING 3-10 MIN: CPT

## 2021-05-27 PROCEDURE — 99350 HOME/RES VST EST HIGH MDM 60: CPT

## 2021-05-27 NOTE — COUNSELING
[Overweight - ( BMI 25.1 - 29.9 )] : overweight -  ( BMI 25.1 - 29.9 ) [TLC diet recommended] : TLC diet recommended [Smoke/CO Detectors] : smoke/CO detectors [Smoker, not interested in quitting] : smoker, not interested in quitting [] : foot exam

## 2021-05-28 VITALS
HEART RATE: 107 BPM | OXYGEN SATURATION: 94 % | RESPIRATION RATE: 20 BRPM | TEMPERATURE: 98.2 F | SYSTOLIC BLOOD PRESSURE: 118 MMHG | DIASTOLIC BLOOD PRESSURE: 70 MMHG

## 2021-05-28 RX ORDER — ACETAMINOPHEN 80 MG
17 TABLET,CHEWABLE ORAL DAILY
Qty: 1 | Refills: 0 | Status: ACTIVE | COMMUNITY
Start: 2021-05-28 | End: 1900-01-01

## 2021-05-28 RX ORDER — FENOFIBRATE 48 MG/1
48 TABLET ORAL
Qty: 1 | Refills: 2 | Status: ACTIVE | COMMUNITY
Start: 2019-09-26 | End: 1900-01-01

## 2021-05-28 RX ORDER — CHLORHEXIDINE GLUCONATE, 0.12% ORAL RINSE 1.2 MG/ML
0.12 SOLUTION DENTAL
Qty: 1 | Refills: 4 | Status: DISCONTINUED | COMMUNITY
Start: 2020-03-12 | End: 2021-05-28

## 2021-05-28 NOTE — HISTORY OF PRESENT ILLNESS
[FreeTextEntry1] : gait and balance  [FreeTextEntry2] : 59 y.o. male lives in [facility] for past  years. Pt is alert and oriented to time place, person and situation. Dx of  COPD, HTN, DM, High Cholesterol. Has hx of MI 4/4/2011. States he does not take his medications as prescribed, has a co payment with medications.  \par \par Being seen for medical f/u.\par Reports he is feeling weaker, needs an aide to clean his room. Instructed him to call his insurance to find out the procedure. Presently pt is awake, alert and oriented x 4. Able to make decisions. Very non compliant with medical follow up with specialists and with medications. \par Pt had recent f/u visit with PULM, still saying he has lung cancer, reminded him he was not diagnosed. He was told based on his years of smoking, and non compliance with his medications he is at high risk for lung cancer. States when he smokes he gets "weak, can't finish the cigarette, gets SOB, I can't even walk" Instructed him his body is telling him to start cutting down and stop. Offered him smoke cessation assistance, wants to wait. \par Denies CP,  has trace wheezing, no changes in appetite, sleep unchanged. C/o constipation bowel movements. Requesting bowel medication. \par \par He reports still not able to sleep, states he is taking 1200 mg of Seroquel, Trazodone 200 mg and Ambien 10 mg.  - psych notes states 600 mg of Seroquel.  Only taking psych medications. \par Pt has not been taking the lipids: Tricor, -Triglycerides 555, -pt reports he is not taking lipid medication\par -called pharmacy to confirm, Atorvastatin  not picked up since 90 days supply in January.  States he was not told and he did not taken either of them. Pharmacist states he does not charge him the co-pay. \par      States he has decreased drinking and decreased the number of cigarettes. Discussed stopping or cutting down. Pt received COVID-19 vaccine, Moderna 2/26 and 3/21 no side effects experienced.

## 2021-06-16 NOTE — PAST MEDICAL HISTORY
Post-Care Instructions: I reviewed with the patient in detail post-care instructions. Patient is to wear sunprotection, and avoid picking at any of the treated lesions. Pt may apply Vaseline to crusted or scabbing areas. Detail Level: Detailed Consent: The patient's consent was obtained including but not limited to risks of crusting, scabbing, blistering, scarring, darker or lighter pigmentary change, recurrence, incomplete removal and infection. Number Of Freeze-Thaw Cycles: 1 freeze-thaw cycle Duration Of Freeze Thaw-Cycle (Seconds): 0 Render Post-Care Instructions In Note?: no [PMH Reviewed and Updated] : past medical history reviewed and updated

## 2021-06-16 NOTE — HEALTH RISK ASSESSMENT
[HRA Reviewed] : Health risk assessment reviewed [Independent] : managing finances [No falls in past year] : Patient reported no falls in the past year [Yes] : The patient does have visual impairment

## 2021-06-16 NOTE — COUNSELING
[Overweight - ( BMI 25.1 - 29.9 )] : overweight -  ( BMI 25.1 - 29.9 ) [TLC diet recommended] : TLC diet recommended [Smoker, not interested in quitting] : smoker, not interested in quitting [Smoke/CO Detectors] : smoke/CO detectors [] : foot exam

## 2021-06-17 ENCOUNTER — APPOINTMENT (OUTPATIENT)
Dept: HOME HEALTH SERVICES | Facility: HOME HEALTH | Age: 61
End: 2021-06-17
Payer: MEDICARE

## 2021-06-17 VITALS
TEMPERATURE: 98 F | DIASTOLIC BLOOD PRESSURE: 70 MMHG | HEART RATE: 102 BPM | SYSTOLIC BLOOD PRESSURE: 120 MMHG | OXYGEN SATURATION: 95 % | RESPIRATION RATE: 18 BRPM

## 2021-06-17 PROCEDURE — 99349 HOME/RES VST EST MOD MDM 40: CPT

## 2021-06-17 RX ORDER — LEVOFLOXACIN 500 MG/1
500 TABLET, FILM COATED ORAL DAILY
Qty: 10 | Refills: 0 | Status: DISCONTINUED | COMMUNITY
Start: 2021-03-16 | End: 2021-06-17

## 2021-06-17 NOTE — HISTORY OF PRESENT ILLNESS
[Patient] : patient [FreeTextEntry1] : gait and balance  [FreeTextEntry2] : 60 y.o. male lives in [facility] for past  years. Pt is alert and oriented to time place, person and situation. Dx of  COPD, HTN, DM, High Cholesterol. Has hx of MI 4/4/2011. Follow up on chronic medical issues.  Noted cardiology appointment on June 30, 2021- follow up on recent MI.  Patient reports that he continues to smoke 7-10 cigarettes a day. \par \par Patient denies fever, cough, trouble breathing, rash, vomiting and diarrhea. Patient has not been in close contact with someone covid positive.\par \par N95 mask, gloves, eye wear and gown used during visit: [Y]. Total face to face time with patient is 40 min.\par \par \par Patient/ patient's caregiver reports no weight loss >10lbs in the past 6 months. No changes in dentition or swallowing reported, No changes in hearing or vision reported. No changes in Cognition reported. Patient denies any symptoms of depression or anxiety. Patient is ADL and IADL independent. No changes in gait or falls reported. \par Patient's home environment is safe.\par \par

## 2021-06-30 ENCOUNTER — LABORATORY RESULT (OUTPATIENT)
Age: 61
End: 2021-06-30

## 2021-06-30 ENCOUNTER — NON-APPOINTMENT (OUTPATIENT)
Age: 61
End: 2021-06-30

## 2021-06-30 ENCOUNTER — APPOINTMENT (OUTPATIENT)
Dept: HEART AND VASCULAR | Facility: CLINIC | Age: 61
End: 2021-06-30
Payer: MEDICARE

## 2021-06-30 VITALS
TEMPERATURE: 97.1 F | HEIGHT: 67 IN | WEIGHT: 154 LBS | SYSTOLIC BLOOD PRESSURE: 129 MMHG | DIASTOLIC BLOOD PRESSURE: 94 MMHG | HEART RATE: 102 BPM | BODY MASS INDEX: 24.17 KG/M2 | OXYGEN SATURATION: 95 %

## 2021-06-30 DIAGNOSIS — Z86.69 PERSONAL HISTORY OF OTHER DISEASES OF THE NERVOUS SYSTEM AND SENSE ORGANS: ICD-10-CM

## 2021-06-30 DIAGNOSIS — Z87.01 PERSONAL HISTORY OF PNEUMONIA (RECURRENT): ICD-10-CM

## 2021-06-30 DIAGNOSIS — Z87.898 PERSONAL HISTORY OF OTHER SPECIFIED CONDITIONS: ICD-10-CM

## 2021-06-30 DIAGNOSIS — Z87.39 PERSONAL HISTORY OF OTHER DISEASES OF THE MUSCULOSKELETAL SYSTEM AND CONNECTIVE TISSUE: ICD-10-CM

## 2021-06-30 DIAGNOSIS — Z00.00 ENCOUNTER FOR GENERAL ADULT MEDICAL EXAMINATION W/OUT ABNORMAL FINDINGS: ICD-10-CM

## 2021-06-30 DIAGNOSIS — E53.8 DEFICIENCY OF OTHER SPECIFIED B GROUP VITAMINS: ICD-10-CM

## 2021-06-30 DIAGNOSIS — F17.200 NICOTINE DEPENDENCE, UNSPECIFIED, UNCOMPLICATED: ICD-10-CM

## 2021-06-30 DIAGNOSIS — M25.569 PAIN IN UNSPECIFIED KNEE: ICD-10-CM

## 2021-06-30 DIAGNOSIS — Z87.19 PERSONAL HISTORY OF OTHER DISEASES OF THE DIGESTIVE SYSTEM: ICD-10-CM

## 2021-06-30 DIAGNOSIS — Z72.0 TOBACCO USE: ICD-10-CM

## 2021-06-30 DIAGNOSIS — Z92.29 PERSONAL HISTORY OF OTHER DRUG THERAPY: ICD-10-CM

## 2021-06-30 DIAGNOSIS — Z86.39 PERSONAL HISTORY OF OTHER ENDOCRINE, NUTRITIONAL AND METABOLIC DISEASE: ICD-10-CM

## 2021-06-30 DIAGNOSIS — Z91.19 PATIENT'S NONCOMPLIANCE WITH OTHER MEDICAL TREATMENT AND REGIMEN: ICD-10-CM

## 2021-06-30 DIAGNOSIS — Z78.9 OTHER SPECIFIED HEALTH STATUS: ICD-10-CM

## 2021-06-30 DIAGNOSIS — Z86.79 PERSONAL HISTORY OF OTHER DISEASES OF THE CIRCULATORY SYSTEM: ICD-10-CM

## 2021-06-30 DIAGNOSIS — M25.552 PAIN IN LEFT HIP: ICD-10-CM

## 2021-06-30 PROCEDURE — 99214 OFFICE O/P EST MOD 30 MIN: CPT

## 2021-06-30 PROCEDURE — 93000 ELECTROCARDIOGRAM COMPLETE: CPT

## 2021-06-30 NOTE — HISTORY OF PRESENT ILLNESS
[FreeTextEntry1] : Mr. Pedersen presents for initial evaluation and management of gait instability, COPD, HTN, DM2, dyslipidemia, ? MI (04/04/11, invasive coronary artery angiogram with normal coronary arteries 08/28/12), and anxiety.  He is seen today with his . He has complaints of occasion intermittent chest pain.  He smokes 0.5 PPD since age 13.  His medical record suggest that he has CAD and had an MI on 04/04/11 for which he received care at [facility].  On 08/28/12, however, he had an invasive coronary artery angiogram at [facility] which revealed normal coronary arteries.  At present, his main complaint is MIDDLETON.

## 2021-06-30 NOTE — REASON FOR VISIT
[Other: ____] : [unfilled] [FreeTextEntry1] : Diagnostic Tests:\par -------------------------------\par ECG:\par 06/30/21: sinus rhythm, possible pulmonary disease pattern. \par 08/11/19: sinus tachycardia, BHARTI, LAD. \par -------------------------------\par Stress tests:\par 12/18/17: dobutamine stress echo: EF 60%, grade I diastolic dysfunction, PASP 35mmHg, no dobutamine -induced ischemia. \par ------------------------------\par Cath:\par 08/28/12: EF 60%, normal coronary artery angiogram.

## 2021-06-30 NOTE — ASSESSMENT
[FreeTextEntry1] : 1. HTN:  BP at ACC/AHA 2017 guideline target \par        - continue amlodipine 10mg po qd\par        - continue HCTZ 12.5mg po qd\par \par 2. Dyslipidemia: LDL NC, HDL 56, tgl 556 non-fasting (04/29/21)\par       - continue atorvastatin 40mg po qd\par       - continue fenofibrate 48mg po qd\par       - discussed therapeutic lifestyle changes to promote improved lipid metabolism \par       - recheck fasting lipid profile today\par \par 3. ? MI 04/04/11, invasive coronary artery angiogram with normal coronary arteries 08/28/12:\par       - he has no evidence of CAD\par       - will manage conservatively at this time\par       - will send for an echocardiogram to rule out regional wall motion abnormality \par \par 4. COPD and tobacco use:\par        - follow up with pulmonologist, Yang Roger MD\par \par An ECG was obtained to evaluate heart rhythm and screen for any underlying cardiac abnormalities.

## 2021-06-30 NOTE — REVIEW OF SYSTEMS
[SOB] : shortness of breath [Dyspnea on exertion] : dyspnea during exertion [Chest Discomfort] : chest discomfort [Confusion] : confusion was observed [Depression] : depression [Anxiety] : anxiety [Negative] : Heme/Lymph

## 2021-07-01 LAB
ALBUMIN SERPL ELPH-MCNC: 4.8 G/DL
ALP BLD-CCNC: 151 U/L
ALT SERPL-CCNC: 30 U/L
ANION GAP SERPL CALC-SCNC: 16 MMOL/L
AST SERPL-CCNC: 19 U/L
BASOPHILS # BLD AUTO: 0.05 K/UL
BASOPHILS NFR BLD AUTO: 0.8 %
BILIRUB SERPL-MCNC: 0.4 MG/DL
BUN SERPL-MCNC: 9 MG/DL
CALCIUM SERPL-MCNC: 9.9 MG/DL
CHLORIDE SERPL-SCNC: 100 MMOL/L
CHOLEST SERPL-MCNC: 204 MG/DL
CO2 SERPL-SCNC: 23 MMOL/L
CREAT SERPL-MCNC: 1.06 MG/DL
EOSINOPHIL # BLD AUTO: 0.14 K/UL
EOSINOPHIL NFR BLD AUTO: 2.1 %
ESTIMATED AVERAGE GLUCOSE: 120 MG/DL
GLUCOSE SERPL-MCNC: 114 MG/DL
HBA1C MFR BLD HPLC: 5.8 %
HCT VFR BLD CALC: 39.5 %
HDLC SERPL-MCNC: 51 MG/DL
HGB BLD-MCNC: 13.1 G/DL
IMM GRANULOCYTES NFR BLD AUTO: 0.3 %
LDLC SERPL CALC-MCNC: 78 MG/DL
LDLC SERPL DIRECT ASSAY-MCNC: 100 MG/DL
LYMPHOCYTES # BLD AUTO: 1.57 K/UL
LYMPHOCYTES NFR BLD AUTO: 24 %
MAN DIFF?: NORMAL
MCHC RBC-ENTMCNC: 33.2 GM/DL
MCHC RBC-ENTMCNC: 35.4 PG
MCV RBC AUTO: 106.8 FL
MONOCYTES # BLD AUTO: 0.61 K/UL
MONOCYTES NFR BLD AUTO: 9.3 %
NEUTROPHILS # BLD AUTO: 4.14 K/UL
NEUTROPHILS NFR BLD AUTO: 63.5 %
NONHDLC SERPL-MCNC: 152 MG/DL
PLATELET # BLD AUTO: 310 K/UL
POTASSIUM SERPL-SCNC: 4 MMOL/L
PROT SERPL-MCNC: 7.1 G/DL
RBC # BLD: 3.7 M/UL
RBC # FLD: 15.2 %
SODIUM SERPL-SCNC: 138 MMOL/L
TRIGL SERPL-MCNC: 372 MG/DL
TSH SERPL-ACNC: 0.71 UIU/ML
WBC # FLD AUTO: 6.53 K/UL

## 2021-07-22 ENCOUNTER — APPOINTMENT (OUTPATIENT)
Dept: HOME HEALTH SERVICES | Facility: HOME HEALTH | Age: 61
End: 2021-07-22
Payer: MEDICARE

## 2021-07-22 VITALS
DIASTOLIC BLOOD PRESSURE: 80 MMHG | RESPIRATION RATE: 18 BRPM | SYSTOLIC BLOOD PRESSURE: 124 MMHG | HEART RATE: 100 BPM | OXYGEN SATURATION: 95 % | HEIGHT: 67 IN | TEMPERATURE: 97.3 F

## 2021-07-22 PROCEDURE — 99349 HOME/RES VST EST MOD MDM 40: CPT

## 2021-07-22 RX ORDER — ALBUTEROL SULFATE 2.5 MG/3ML
(2.5 MG/3ML) SOLUTION RESPIRATORY (INHALATION) EVERY 6 HOURS
Qty: 1 | Refills: 3 | Status: DISCONTINUED | COMMUNITY
Start: 2019-11-22 | End: 2021-07-22

## 2021-07-22 RX ORDER — BUDESONIDE AND FORMOTEROL FUMARATE DIHYDRATE 160; 4.5 UG/1; UG/1
160-4.5 AEROSOL RESPIRATORY (INHALATION) TWICE DAILY
Qty: 1 | Refills: 4 | Status: DISCONTINUED | COMMUNITY
Start: 2019-11-22 | End: 2021-07-22

## 2021-07-22 RX ORDER — NEBULIZER ACCESSORIES
KIT MISCELLANEOUS
Qty: 1 | Refills: 0 | Status: DISCONTINUED | COMMUNITY
Start: 2019-11-22 | End: 2021-07-22

## 2021-07-22 NOTE — PHYSICAL EXAM
[No Acute Distress] : no acute distress [EOMI] : extra ocular movement intact [Normal Outer Ear/Nose] : the ears and nose were normal in appearance [Supple] : the neck was supple [No Respiratory Distress] : no respiratory distress [No Accessory Muscle Use] : no accessory muscle use [Normal Rate] : heart rate was normal  [No Murmurs] : no murmurs heard [No Edema] : there was no peripheral edema [Normal Bowel Sounds] : normal bowel sounds [Non Tender] : non-tender [Soft] : abdomen soft [Patient Refused] : rectal exam was refused by the patient [No CVA Tenderness] : no ~M costovertebral angle tenderness [No Rash] : no rash [No Skin Lesions] : no skin lesions [Oriented x3] : oriented to person, place, and time [Cranial Nerves Intact] : cranial nerves 2-12 were intact [Normal Affect] : the affect was normal [Normal Mood] : the mood was normal [Over the Past 2 Weeks, Have You Felt Down, Depressed, or Hopeless?] : 1.) Over the past 2 weeks, have you felt down, depressed, or hopeless? Yes [Over the Past 2 Weeks, Have You Felt Little Interest or Pleasure Doing Things?] : 2.) Over the past 2 weeks, have you felt little interest or pleasure doing things? No [Foot Ulcers] : no foot ulcers [FreeTextEntry1] : colonoscopy recommended

## 2021-07-22 NOTE — HISTORY OF PRESENT ILLNESS
[Patient] : patient [FreeTextEntry1] : gait and balance  [FreeTextEntry2] : 60 y.o. male lives in [facility] for past  years. Pt is alert and oriented to time place, person and situation. Dx of  COPD, HTN, DM, High Cholesterol. Has hx of MI 4/4/2011. Follow up on chronic medical issues.  Noted cardiology appointment on June 30, 2021- follow up on recent MI.  Patient reports that he continues to smoke 7-10 cigarettes a day. 2-3 drinks a day beer and hard alcohol.  Reports embarrassing times of urinary incontinence. \par \par Patient denies fever, cough, trouble breathing, rash, vomiting and diarrhea. Patient has not been in close contact with someone covid positive.\par \par N95 mask, gloves, eye wear and gown used during visit: [Y]. Total face to face time with patient is 40 min.\par \par \par Patient/ patient's caregiver reports no weight loss >10lbs in the past 6 months. No changes in dentition or swallowing reported, No changes in hearing or vision reported. No changes in Cognition reported. Patient denies any symptoms of depression or anxiety. Patient is ADL and IADL independent. No changes in gait or falls reported. \par Patient's home environment is safe.\par \par

## 2021-08-19 ENCOUNTER — APPOINTMENT (OUTPATIENT)
Dept: HOME HEALTH SERVICES | Facility: HOME HEALTH | Age: 61
End: 2021-08-19

## 2021-08-24 ENCOUNTER — APPOINTMENT (OUTPATIENT)
Dept: PULMONOLOGY | Facility: CLINIC | Age: 61
End: 2021-08-24

## 2021-08-31 ENCOUNTER — NON-APPOINTMENT (OUTPATIENT)
Age: 61
End: 2021-08-31

## 2021-09-13 ENCOUNTER — APPOINTMENT (OUTPATIENT)
Dept: PULMONOLOGY | Facility: CLINIC | Age: 61
End: 2021-09-13

## 2021-09-15 PROBLEM — Z71.89 ADVANCED CARE PLANNING/COUNSELING DISCUSSION: Status: RESOLVED | Noted: 2019-08-30 | Resolved: 2021-09-15

## 2021-09-22 ENCOUNTER — APPOINTMENT (OUTPATIENT)
Dept: HEART AND VASCULAR | Facility: CLINIC | Age: 61
End: 2021-09-22
Payer: MEDICARE

## 2021-09-22 VITALS
WEIGHT: 150 LBS | HEART RATE: 115 BPM | BODY MASS INDEX: 23.54 KG/M2 | OXYGEN SATURATION: 98 % | DIASTOLIC BLOOD PRESSURE: 89 MMHG | SYSTOLIC BLOOD PRESSURE: 128 MMHG | HEIGHT: 67 IN

## 2021-09-22 DIAGNOSIS — Z71.89 OTHER SPECIFIED COUNSELING: ICD-10-CM

## 2021-09-22 PROCEDURE — 99214 OFFICE O/P EST MOD 30 MIN: CPT

## 2021-09-22 RX ORDER — ASPIRIN 81 MG/1
81 TABLET, COATED ORAL
Qty: 90 | Refills: 2 | Status: DISCONTINUED | COMMUNITY
Start: 2019-09-27 | End: 2021-09-22

## 2021-09-22 NOTE — HISTORY OF PRESENT ILLNESS
[FreeTextEntry1] : Mr. Pedersen presents for follow up and management of gait instability, COPD, HTN, DM2, dyslipidemia, ? MI (04/04/11, invasive coronary artery angiogram with normal coronary arteries 08/28/12), and anxiety.  He is seen with his . He has complaints of occasion intermittent chest pain.  He smokes 0.5 PPD since age 13.  His medical record suggest that he has CAD and had an MI on 04/04/11 for which he received care at [facility].  On 08/28/12, however, he had an invasive coronary artery angiogram at [facility] which revealed normal coronary arteries.  At present, his main complaint is MIDDLETON which he feels has not improved on inhalers.  He smokes about 8-10 cigarettes per day.

## 2021-09-22 NOTE — PHYSICAL EXAM
[Well Developed] : well developed [Well Nourished] : well nourished [No Acute Distress] : no acute distress [Normal Conjunctiva] : normal conjunctiva [Normal Venous Pressure] : normal venous pressure [No Carotid Bruit] : no carotid bruit [Normal S1, S2] : normal S1, S2 [No Murmur] : no murmur [No Rub] : no rub [No Gallop] : no gallop [Clear Lung Fields] : clear lung fields [Good Air Entry] : good air entry [No Respiratory Distress] : no respiratory distress  [Soft] : abdomen soft [Non Tender] : non-tender [No Masses/organomegaly] : no masses/organomegaly [Normal Bowel Sounds] : normal bowel sounds [Normal Gait] : normal gait [No Edema] : no edema [No Cyanosis] : no cyanosis [No Clubbing] : no clubbing [No Varicosities] : no varicosities [No Rash] : no rash [No Skin Lesions] : no skin lesions [Moves all extremities] : moves all extremities [No Focal Deficits] : no focal deficits [Normal Speech] : normal speech [Alert and Oriented] : alert and oriented [Normal memory] : normal memory [Appears Anxious] : appears anxious [de-identified] : + tachycardia

## 2021-09-22 NOTE — ASSESSMENT
[FreeTextEntry1] : 1. HTN:  BP at ACC/AHA 2017 guideline target \par        - continue amlodipine 10mg po qd\par \par 2. Dyslipidemia:  (06/01/21)\par       - continue atorvastatin 40mg po qd\par       - continue fenofibrate 48mg po qd\par       - discussed therapeutic lifestyle changes to promote improved lipid metabolism \par \par 3. ? MI 04/04/11, invasive coronary artery angiogram with normal coronary arteries 08/28/12:\par       - he has no evidence of CAD\par       - will manage conservatively at this time\par       - will send for an echocardiogram to rule out regional wall motion abnormality \par \par 4. COPD and tobacco use:\par        - follow up with pulmonologist, Yang Roger MD\par \par

## 2021-10-14 ENCOUNTER — APPOINTMENT (OUTPATIENT)
Dept: HOME HEALTH SERVICES | Facility: HOME HEALTH | Age: 61
End: 2021-10-14
Payer: MEDICARE

## 2021-10-14 VITALS
TEMPERATURE: 97 F | OXYGEN SATURATION: 98 % | DIASTOLIC BLOOD PRESSURE: 80 MMHG | SYSTOLIC BLOOD PRESSURE: 122 MMHG | HEART RATE: 100 BPM | RESPIRATION RATE: 18 BRPM

## 2021-10-14 PROCEDURE — 99349 HOME/RES VST EST MOD MDM 40: CPT

## 2021-10-14 NOTE — HISTORY OF PRESENT ILLNESS
[Patient] : patient [FreeTextEntry1] : gait and balance  [FreeTextEntry2] : 60 y.o. male lives in [facility] for past  years. Pt is alert and oriented to time place, person and situation. Dx of  COPD, HTN, DM, High Cholesterol. Has hx of MI 4/4/2011. Follow up on chronic medical issues.  Noted cardiology appointment on June 30, 2021- follow up on recent MI.  Patient reports that he continues to smoke 7-10 cigarettes a day. 2-3 drinks a day beer and hard alcohol.  Reports embarrassing times of urinary incontinence. \par \par Patient denies fever, cough, trouble breathing, rash, vomiting and diarrhea. Patient has not been in close contact with someone covid positive.\par \par N95 mask, gloves, eye wear and gown used during visit: [Y]. Total face to face time with patient is 40 min.\par \par \par Patient/ patient's caregiver reports no weight loss >10lbs in the past 6 months. No changes in dentition or swallowing reported, No changes in hearing or vision reported. No changes in Cognition reported. Patient denies any symptoms of depression or anxiety. Patient is ADL and IADL independent. No changes in gait or falls reported. \par Patient's home environment is safe.\par \par

## 2021-10-14 NOTE — COUNSELING
[Overweight - ( BMI 25.1 - 29.9 )] : overweight -  ( BMI 25.1 - 29.9 ) [TLC diet recommended] : TLC diet recommended [Smoker, not interested in quitting] : smoker, not interested in quitting [Smoke/CO Detectors] : smoke/CO detectors [] : foot exam [Decrease stress] : decrease stress [Discussed disease trajectory with patient/caregiver] : discussed disease trajectory with patient/caregiver [Advanced Directives discussed: ____] : Advanced directives discussed: [unfilled] [DNR] : Code Status: DNR [No Limitations] : Treatment Guidelines: No limitations [DNI] : Intubation: DNI [Last Verification Date: _____] : [facility] Completion/last verification date: [unfilled] [FreeTextEntry4] : Educated patient/legal representative about CCM services and consent.\par Educated patient/legal representative that this service is available because they have 2 or more chronic conditions, that only one Provider can furnish CCM Services per calendar month and that CCM services are subject to the usual Medicare deductible and coinsurance. \par Patient/legal representative understands the right to stop the CCM services at any time by notifying House Calls office.\par Patient/legal representative has verbally consented 10/2021\par \par

## 2021-11-02 ENCOUNTER — NON-APPOINTMENT (OUTPATIENT)
Age: 61
End: 2021-11-02

## 2021-11-02 LAB
ALBUMIN SERPL ELPH-MCNC: 5.1 G/DL
ALP BLD-CCNC: 146 U/L
ALT SERPL-CCNC: 34 U/L
ANION GAP SERPL CALC-SCNC: 16 MMOL/L
AST SERPL-CCNC: 21 U/L
BASOPHILS # BLD AUTO: 0.07 K/UL
BASOPHILS NFR BLD AUTO: 1.4 %
BILIRUB SERPL-MCNC: 0.2 MG/DL
BUN SERPL-MCNC: 13 MG/DL
CALCIUM SERPL-MCNC: 10.2 MG/DL
CHLORIDE SERPL-SCNC: 100 MMOL/L
CO2 SERPL-SCNC: 24 MMOL/L
CREAT SERPL-MCNC: 0.98 MG/DL
EOSINOPHIL # BLD AUTO: 0.1 K/UL
EOSINOPHIL NFR BLD AUTO: 2 %
GLUCOSE SERPL-MCNC: 93 MG/DL
HCT VFR BLD CALC: 38.8 %
HGB BLD-MCNC: 13.2 G/DL
IMM GRANULOCYTES NFR BLD AUTO: 0.2 %
LYMPHOCYTES # BLD AUTO: 1.72 K/UL
LYMPHOCYTES NFR BLD AUTO: 34.6 %
MAN DIFF?: NORMAL
MCHC RBC-ENTMCNC: 34 GM/DL
MCHC RBC-ENTMCNC: 34.9 PG
MCV RBC AUTO: 102.6 FL
MONOCYTES # BLD AUTO: 0.72 K/UL
MONOCYTES NFR BLD AUTO: 14.5 %
NEUTROPHILS # BLD AUTO: 2.35 K/UL
NEUTROPHILS NFR BLD AUTO: 47.3 %
PLATELET # BLD AUTO: 305 K/UL
POTASSIUM SERPL-SCNC: 4.6 MMOL/L
PROT SERPL-MCNC: 7.7 G/DL
RBC # BLD: 3.78 M/UL
RBC # FLD: 14.6 %
SODIUM SERPL-SCNC: 140 MMOL/L
WBC # FLD AUTO: 4.97 K/UL

## 2021-11-04 ENCOUNTER — NON-APPOINTMENT (OUTPATIENT)
Age: 61
End: 2021-11-04

## 2021-11-04 ENCOUNTER — APPOINTMENT (OUTPATIENT)
Dept: HOME HEALTH SERVICES | Facility: HOME HEALTH | Age: 61
End: 2021-11-04
Payer: MEDICARE

## 2021-11-04 VITALS
RESPIRATION RATE: 18 BRPM | HEART RATE: 89 BPM | SYSTOLIC BLOOD PRESSURE: 120 MMHG | OXYGEN SATURATION: 97 % | TEMPERATURE: 97 F | DIASTOLIC BLOOD PRESSURE: 70 MMHG

## 2021-11-04 VITALS
HEART RATE: 90 BPM | SYSTOLIC BLOOD PRESSURE: 120 MMHG | OXYGEN SATURATION: 97 % | TEMPERATURE: 97 F | RESPIRATION RATE: 18 BRPM | DIASTOLIC BLOOD PRESSURE: 80 MMHG

## 2021-11-04 DIAGNOSIS — F52.21 MALE ERECTILE DISORDER: ICD-10-CM

## 2021-11-04 PROCEDURE — 99349 HOME/RES VST EST MOD MDM 40: CPT

## 2021-11-04 NOTE — HISTORY OF PRESENT ILLNESS
[FreeTextEntry1] : gait and balance  [FreeTextEntry2] : 60 y.o. male lives in [facility] for past  years. Pt is alert and oriented to time place, person and situation. Dx of  COPD, HTN, DM, High Cholesterol. Has hx of MI 4/4/2011. Follow up on chronic medical issues.  Noted cardiology appointment on June 30, 2021- follow up on recent MI.  Patient reports that he continues to smoke 7-10 cigarettes a day. 2-3 drinks a day beer and hard alcohol.  Reports embarrassing times of urinary incontinence. \par \par Patient denies fever, cough, trouble breathing, rash, vomiting and diarrhea. Patient has not been in close contact with someone covid positive.\par \par N95 mask, gloves, eye wear and gown used during visit: [Y]. Total face to face time with patient is 40 min.\par \par \par Patient/ patient's caregiver reports no weight loss >10lbs in the past 6 months. No changes in dentition or swallowing reported, No changes in hearing or vision reported. No changes in Cognition reported. Patient denies any symptoms of depression or anxiety. Patient is ADL and IADL independent. No changes in gait or falls reported. \par Patient's home environment is safe.\par \par

## 2021-11-04 NOTE — COUNSELING
[FreeTextEntry4] : Educated patient/legal representative about CCM services and consent.\par Educated patient/legal representative that this service is available because they have 2 or more chronic conditions, that only one Provider can furnish CCM Services per calendar month and that CCM services are subject to the usual Medicare deductible and coinsurance. \par Patient/legal representative understands the right to stop the CCM services at any time by notifying House Calls office.\par Patient/legal representative has verbally consented 10/2021\par \par

## 2021-11-04 NOTE — PHYSICAL EXAM
[Over the Past 2 Weeks, Have You Felt Little Interest or Pleasure Doing Things?] : 2.) Over the past 2 weeks, have you felt little interest or pleasure doing things? No [Foot Ulcers] : no foot ulcers [FreeTextEntry1] : colonoscopy recommended

## 2021-12-21 PROBLEM — R06.00 DOE (DYSPNEA ON EXERTION): Status: ACTIVE | Noted: 2021-06-30

## 2021-12-21 NOTE — HISTORY OF PRESENT ILLNESS
[FreeTextEntry1] : Mr. Pedersen presents for follow up and management of gait instability, COPD, HTN, DM2, dyslipidemia, ? MI (04/04/11, invasive coronary artery angiogram with normal coronary arteries 08/28/12), and anxiety.  He is seen with his . He has complaints of occasional intermittent chest pain.  He smokes 0.5 PPD since age 13.  His medical record suggest that he has CAD and had an MI on 04/04/11 for which he received care at [facility].  On 08/28/12, however, he had an invasive coronary artery angiogram at [facility] which revealed normal coronary arteries.  At present, his main complaint is MIDDLETON which he feels has not improved on inhalers.  He smokes about 8-10 cigarettes per day.

## 2021-12-21 NOTE — PHYSICAL EXAM
[Well Developed] : well developed [Well Nourished] : well nourished [No Acute Distress] : no acute distress [Normal Conjunctiva] : normal conjunctiva [Normal Venous Pressure] : normal venous pressure [No Carotid Bruit] : no carotid bruit [Normal S1, S2] : normal S1, S2 [No Murmur] : no murmur [No Rub] : no rub [No Gallop] : no gallop [Clear Lung Fields] : clear lung fields [Good Air Entry] : good air entry [No Respiratory Distress] : no respiratory distress  [Soft] : abdomen soft [Non Tender] : non-tender [No Masses/organomegaly] : no masses/organomegaly [Normal Bowel Sounds] : normal bowel sounds [Normal Gait] : normal gait [No Edema] : no edema [No Cyanosis] : no cyanosis [No Clubbing] : no clubbing [No Varicosities] : no varicosities [No Rash] : no rash [No Skin Lesions] : no skin lesions [Moves all extremities] : moves all extremities [No Focal Deficits] : no focal deficits [Normal Speech] : normal speech [Alert and Oriented] : alert and oriented [Normal memory] : normal memory [Appears Anxious] : appears anxious [de-identified] : + tachycardia

## 2021-12-22 ENCOUNTER — APPOINTMENT (OUTPATIENT)
Dept: HEART AND VASCULAR | Facility: CLINIC | Age: 61
End: 2021-12-22

## 2021-12-22 DIAGNOSIS — R06.00 DYSPNEA, UNSPECIFIED: ICD-10-CM

## 2021-12-30 ENCOUNTER — APPOINTMENT (OUTPATIENT)
Dept: HOME HEALTH SERVICES | Facility: HOME HEALTH | Age: 61
End: 2021-12-30

## 2022-01-17 ENCOUNTER — LABORATORY RESULT (OUTPATIENT)
Age: 62
End: 2022-01-17

## 2022-01-27 ENCOUNTER — APPOINTMENT (OUTPATIENT)
Dept: HOME HEALTH SERVICES | Facility: HOME HEALTH | Age: 62
End: 2022-01-27

## 2022-02-07 ENCOUNTER — RX RENEWAL (OUTPATIENT)
Age: 62
End: 2022-02-07

## 2022-02-07 DIAGNOSIS — Z23 ENCOUNTER FOR IMMUNIZATION: ICD-10-CM

## 2022-02-10 RX ORDER — SILDENAFIL 50 MG/1
50 TABLET ORAL
Qty: 10 | Refills: 5 | Status: ACTIVE | COMMUNITY
Start: 2022-02-10 | End: 1900-01-01

## 2022-02-15 ENCOUNTER — APPOINTMENT (OUTPATIENT)
Dept: HOME HEALTH SERVICES | Facility: HOME HEALTH | Age: 62
End: 2022-02-15
Payer: MEDICARE

## 2022-02-15 VITALS
SYSTOLIC BLOOD PRESSURE: 122 MMHG | OXYGEN SATURATION: 96 % | TEMPERATURE: 97.2 F | HEART RATE: 80 BPM | RESPIRATION RATE: 18 BRPM | DIASTOLIC BLOOD PRESSURE: 70 MMHG

## 2022-02-15 PROCEDURE — 99349 HOME/RES VST EST MOD MDM 40: CPT

## 2022-02-15 RX ORDER — AMLODIPINE BESYLATE 10 MG/1
10 TABLET ORAL DAILY
Qty: 1 | Refills: 2 | Status: ACTIVE | COMMUNITY
Start: 2020-01-21

## 2022-02-15 NOTE — COUNSELING
[TLC diet recommended] : TLC diet recommended [Overweight - ( BMI 25.1 - 29.9 )] : overweight -  ( BMI 25.1 - 29.9 ) [Smoker, not interested in quitting] : smoker, not interested in quitting [Smoke/CO Detectors] : smoke/CO detectors [] : foot exam [Decrease stress] : decrease stress [Discussed disease trajectory with patient/caregiver] : discussed disease trajectory with patient/caregiver [Advanced Directives discussed: ____] : Advanced directives discussed: [unfilled] [DNR] : Code Status: DNR [No Limitations] : Treatment Guidelines: No limitations [DNI] : Intubation: DNI [[facility] Ver[facility] Date: _____] : [facility] Completion/last verification date: [unfilled] [FreeTextEntry4] : Educated patient/legal representative about CCM services and consent.\par Educated patient/legal representative that this service is available because they have 2 or more chronic conditions, that only one Provider can furnish CCM Services per calendar month and that CCM services are subject to the usual Medicare deductible and coinsurance. \par Patient/legal representative understands the right to stop the CCM services at any time by notifying House Calls office.\par Patient/legal representative has verbally consented 10/2021\par \par

## 2022-02-15 NOTE — CHRONIC CARE ASSESSMENT
[PPS Score: ____] : Palliative Performance Scale (PPS) Score: [unfilled] [de-identified] : Risk for falls secondary to ETOH abuse [de-identified] : as tolerated [de-identified] : diabetic diet

## 2022-02-15 NOTE — HISTORY OF PRESENT ILLNESS
[Patient] : patient [FreeTextEntry1] : gait and balance  [FreeTextEntry2] : 61 y.o. male lives in [facility] for past  years. Pt is alert and oriented to time place, person and situation. Dx of  COPD, HTN, DM, High Cholesterol. Has hx of MI 4/4/2011. Follow up on chronic medical issues.  Noted cardiology appointment on June 30, 2021- follow up on chronic medical issues.  Reports left shoulder pain- refer to rehab.  \par \par Patient denies fever, cough, trouble breathing, rash, vomiting and diarrhea. Patient has not been in close contact with someone covid positive.\par \par N95 mask, gloves, eye wear and gown used during visit: [Y]. Total face to face time with patient is 40 min.\par \par \par Patient/ patient's caregiver reports no weight loss >10lbs in the past 6 months. No changes in dentition or swallowing reported, No changes in hearing or vision reported. No changes in Cognition reported. Patient denies any symptoms of depression or anxiety. Patient is ADL and IADL independent. No changes in gait or falls reported. \par Patient's home environment is safe.\par \par

## 2022-02-15 NOTE — PHYSICAL EXAM
[No Acute Distress] : no acute distress [EOMI] : extra ocular movement intact [Normal Outer Ear/Nose] : the ears and nose were normal in appearance [Supple] : the neck was supple [No Respiratory Distress] : no respiratory distress [No Accessory Muscle Use] : no accessory muscle use [Normal Rate] : heart rate was normal  [No Murmurs] : no murmurs heard [No Edema] : there was no peripheral edema [Normal Bowel Sounds] : normal bowel sounds [Non Tender] : non-tender [Soft] : abdomen soft [Patient Refused] : rectal exam was refused by the patient [No Rash] : no rash [No CVA Tenderness] : no ~M costovertebral angle tenderness [No Skin Lesions] : no skin lesions [Cranial Nerves Intact] : cranial nerves 2-12 were intact [Oriented x3] : oriented to person, place, and time [Normal Affect] : the affect was normal [Normal Mood] : the mood was normal [Over the Past 2 Weeks, Have You Felt Down, Depressed, or Hopeless?] : 1.) Over the past 2 weeks, have you felt down, depressed, or hopeless? Yes [Over the Past 2 Weeks, Have You Felt Little Interest or Pleasure Doing Things?] : 2.) Over the past 2 weeks, have you felt little interest or pleasure doing things? No [Foot Ulcers] : no foot ulcers [FreeTextEntry1] : colonoscopy recommended

## 2022-02-16 ENCOUNTER — FORM ENCOUNTER (OUTPATIENT)
Age: 62
End: 2022-02-16

## 2022-04-07 ENCOUNTER — APPOINTMENT (OUTPATIENT)
Dept: HOME HEALTH SERVICES | Facility: HOME HEALTH | Age: 62
End: 2022-04-07

## 2022-04-07 NOTE — HISTORY OF PRESENT ILLNESS
[Patient] : patient [FreeTextEntry1] : gait and balance  [FreeTextEntry2] : 61 y.o. male lives in [facility] for past  years. Pt is alert and oriented to time place, person and situation. Dx of  COPD, HTN, DM, High Cholesterol. Has hx of MI 4/4/2011. Follow up on chronic medical issues.  Noted cardiology appointment on June 30, 2021- follow up on chronic medical issues.  Reports left shoulder pain- refer to rehab.  \par \par Patient denies fever, cough, trouble breathing, rash, vomiting and diarrhea. Patient has not been in close contact with someone covid positive.\par \par N95 mask, gloves, eye wear and gown used during visit: [Y]. Total face to face time with patient is 40 min.\par \par \par Patient/ patient's caregiver reports no weight loss >10lbs in the past 6 months. No changes in dentition or swallowing reported, No changes in hearing or vision reported. No changes in Cognition reported. Patient denies any symptoms of depression or anxiety. Patient is ADL and IADL independent. No changes in gait or falls reported. \par Patient's home environment is safe.\par \par

## 2022-04-07 NOTE — CHRONIC CARE ASSESSMENT
[PPS Score: ____] : Palliative Performance Scale (PPS) Score: [unfilled] [de-identified] : Risk for falls secondary to ETOH abuse [de-identified] : as tolerated [de-identified] : diabetic diet

## 2022-04-07 NOTE — COUNSELING
[Overweight - ( BMI 25.1 - 29.9 )] : overweight -  ( BMI 25.1 - 29.9 ) [TLC diet recommended] : TLC diet recommended [Smoker, not interested in quitting] : smoker, not interested in quitting [Smoke/CO Detectors] : smoke/CO detectors [] : foot exam [Decrease stress] : decrease stress [Discussed disease trajectory with patient/caregiver] : discussed disease trajectory with patient/caregiver [Advanced Directives discussed: ____] : Advanced directives discussed: [unfilled] [DNR] : Code Status: DNR [No Limitations] : Treatment Guidelines: No limitations [DNI] : Intubation: DNI [Last Ver[facility] Date: _____] [facility] Completion/last verification date: [unfilled] [FreeTextEntry4] : Educated patient/legal representative about CCM services and consent.\par Educated patient/legal representative that this service is available because they have 2 or more chronic conditions, that only one Provider can furnish CCM Services per calendar month and that CCM services are subject to the usual Medicare deductible and coinsurance. \par Patient/legal representative understands the right to stop the CCM services at any time by notifying House Calls office.\par Patient/legal representative has verbally consented 10/2021\par \par

## 2022-06-15 NOTE — CHRONIC CARE ASSESSMENT
[PPS Score: ____] : Palliative Performance Scale (PPS) Score: [unfilled] [de-identified] : Risk for falls secondary to ETOH abuse [de-identified] : as tolerated [de-identified] : diabetic diet

## 2022-06-15 NOTE — COUNSELING
[Overweight - ( BMI 25.1 - 29.9 )] : overweight -  ( BMI 25.1 - 29.9 ) [TLC diet recommended] : TLC diet recommended [Smoker, not interested in quitting] : smoker, not interested in quitting [Smoke/CO Detectors] : smoke/CO detectors [] : eye exam [Decrease stress] : decrease stress [Discussed disease trajectory with patient/caregiver] : discussed disease trajectory with patient/caregiver [Advanced Directives discussed: ____] : Advanced directives discussed: [unfilled] [DNR] : Code Status: DNR [No Limitations] : Treatment Guidelines: No limitations [DNI] : Intubation: DNI [Last Ver[facility] Date: _____] : [facility] Completion/last verification date: [unfilled] [FreeTextEntry4] : Educated patient/legal representative about CCM services and consent.\par Educated patient/legal representative that this service is available because they have 2 or more chronic conditions, that only one Provider can furnish CCM Services per calendar month and that CCM services are subject to the usual Medicare deductible and coinsurance. \par Patient/legal representative understands the right to stop the CCM services at any time by notifying House Calls office.\par Patient/legal representative has verbally consented 10/2021\par \par

## 2022-06-15 NOTE — HISTORY OF PRESENT ILLNESS
[Patient] : patient [FreeTextEntry1] : gait and balance  [FreeTextEntry2] : 61 y.o. male lives in [facility] for past  years. Pt is alert and oriented to time place, person and situation. Dx of  COPD, HTN, DM, High Cholesterol. Has hx of MI 4/4/2011. Follow up on chronic medical issues.  Noted cardiology appointment on June 30, 2021- follow up on chronic medical issues.  Reports left shoulder pain- refer to rehab.  \par \par Patient denies fever, cough, trouble breathing, rash, vomiting and diarrhea. Patient has not been in close contact with someone covid positive.\par \par N95 mask, gloves, eye wear and gown used during visit: [Y]. Total face to face time with patient is 40 min.\par \par \par Patient/ patient's caregiver reports no weight loss >10lbs in the past 6 months. No changes in dentition or swallowing reported, No changes in hearing or vision reported. No changes in Cognition reported. Patient denies any symptoms of depression or anxiety. Patient is ADL and IADL independent. No changes in gait or falls reported. \par Patient's home environment is safe.\par \par

## 2022-06-16 ENCOUNTER — APPOINTMENT (OUTPATIENT)
Dept: HOME HEALTH SERVICES | Facility: HOME HEALTH | Age: 62
End: 2022-06-16

## 2022-06-30 ENCOUNTER — APPOINTMENT (OUTPATIENT)
Dept: HOME HEALTH SERVICES | Facility: HOME HEALTH | Age: 62
End: 2022-06-30

## 2022-06-30 VITALS
TEMPERATURE: 97 F | RESPIRATION RATE: 18 BRPM | HEART RATE: 77 BPM | SYSTOLIC BLOOD PRESSURE: 120 MMHG | DIASTOLIC BLOOD PRESSURE: 70 MMHG | OXYGEN SATURATION: 93 %

## 2022-06-30 VITALS
HEART RATE: 70 BPM | SYSTOLIC BLOOD PRESSURE: 124 MMHG | DIASTOLIC BLOOD PRESSURE: 80 MMHG | RESPIRATION RATE: 18 BRPM | OXYGEN SATURATION: 94 % | TEMPERATURE: 97 F

## 2022-06-30 PROCEDURE — 99349 HOME/RES VST EST MOD MDM 40: CPT

## 2022-06-30 NOTE — CHRONIC CARE ASSESSMENT
[de-identified] : Risk for falls secondary to ETOH abuse [de-identified] : as tolerated [de-identified] : diabetic diet

## 2022-06-30 NOTE — HISTORY OF PRESENT ILLNESS
[FreeTextEntry1] : gait and balance  [FreeTextEntry2] : 61 y.o. male lives in [facility] for past  years. Pt is alert and oriented to time place, person and situation. Dx of  COPD, HTN, DM, High Cholesterol. Has hx of MI 4/4/2011. Follow up on chronic medical issues.  Reported drinking a half pint of vodka yesterday.  Currently denies drinking.  States he is anxious because his TV blew out.  Needs TV to help him sleep.  Facility providing a spear TV. \par \par Patient denies fever, cough, trouble breathing, rash, vomiting and diarrhea. Patient has not been in close contact with someone covid positive.\par \par N95 mask, gloves, eye wear and gown used during visit: [Y]. Total face to face time with patient is 40 min.\par \par \par Patient/ patient's caregiver reports no weight loss >10lbs in the past 6 months. No changes in dentition or swallowing reported, No changes in hearing or vision reported. No changes in Cognition reported. Patient denies any symptoms of depression or anxiety. Patient is ADL and IADL independent. No changes in gait or falls reported. \par Patient's home environment is safe.\par \par

## 2022-07-06 ENCOUNTER — LABORATORY RESULT (OUTPATIENT)
Age: 62
End: 2022-07-06

## 2022-07-06 LAB
BASOPHILS # BLD AUTO: 0.07 K/UL
BASOPHILS NFR BLD AUTO: 0.8 %
EOSINOPHIL # BLD AUTO: 0.31 K/UL
EOSINOPHIL NFR BLD AUTO: 3.7 %
HCT VFR BLD CALC: 39.4 %
HGB BLD-MCNC: 13.8 G/DL
IMM GRANULOCYTES NFR BLD AUTO: 0.4 %
LYMPHOCYTES # BLD AUTO: 2.61 K/UL
LYMPHOCYTES NFR BLD AUTO: 31.6 %
MAN DIFF?: NORMAL
MCHC RBC-ENTMCNC: 35 GM/DL
MCHC RBC-ENTMCNC: 36.2 PG
MCV RBC AUTO: 103.4 FL
MONOCYTES # BLD AUTO: 0.58 K/UL
MONOCYTES NFR BLD AUTO: 7 %
NEUTROPHILS # BLD AUTO: 4.67 K/UL
NEUTROPHILS NFR BLD AUTO: 56.5 %
PLATELET # BLD AUTO: 167 K/UL
RBC # BLD: 3.81 M/UL
RBC # FLD: 13.9 %
WBC # FLD AUTO: 8.27 K/UL

## 2022-07-07 LAB
ALBUMIN SERPL ELPH-MCNC: 5.1 G/DL
ALP BLD-CCNC: 164 U/L
ALT SERPL-CCNC: 24 U/L
ANION GAP SERPL CALC-SCNC: 22 MMOL/L
AST SERPL-CCNC: 38 U/L
BILIRUB SERPL-MCNC: 0.4 MG/DL
BUN SERPL-MCNC: 11 MG/DL
CALCIUM SERPL-MCNC: 9.4 MG/DL
CHLORIDE SERPL-SCNC: 100 MMOL/L
CHOLEST SERPL-MCNC: 269 MG/DL
CO2 SERPL-SCNC: 20 MMOL/L
CREAT SERPL-MCNC: 0.95 MG/DL
EGFR: 90 ML/MIN/1.73M2
GLUCOSE SERPL-MCNC: 84 MG/DL
HDLC SERPL-MCNC: 116 MG/DL
LDLC SERPL CALC-MCNC: 115 MG/DL
NONHDLC SERPL-MCNC: 154 MG/DL
POTASSIUM SERPL-SCNC: 4.2 MMOL/L
PROT SERPL-MCNC: 7.4 G/DL
SODIUM SERPL-SCNC: 142 MMOL/L
TRIGL SERPL-MCNC: 194 MG/DL
TSH SERPL-ACNC: 0.93 UIU/ML

## 2022-11-02 NOTE — COUNSELING
[Overweight - ( BMI 25.1 - 29.9 )] : overweight -  ( BMI 25.1 - 29.9 ) [TLC diet recommended] : TLC diet recommended [Smoker, not interested in quitting] : smoker, not interested in quitting [Smoke/CO Detectors] : smoke/CO detectors [] : foot exam [Decrease stress] : decrease stress [Discussed disease trajectory with patient/caregiver] : discussed disease trajectory with patient/caregiver [Advanced Directives discussed: ____] : Advanced directives discussed: [unfilled] [DNR] : Code Status: DNR [No Limitations] : Treatment Guidelines: No limitations [DNI] : Intubation: DNI [[facility] Date: _____] [facility] Completion/last verification date: [unfilled] [FreeTextEntry4] : Educated patient/legal representative about CCM services and consent.\par Educated patient/legal representative that this service is available because they have 2 or more chronic conditions, that only one Provider can furnish CCM Services per calendar month and that CCM services are subject to the usual Medicare deductible and coinsurance. \par Patient/legal representative understands the right to stop the CCM services at any time by notifying House Calls office.\par Patient/legal representative has verbally consented 10/2021\par \par

## 2022-11-02 NOTE — HISTORY OF PRESENT ILLNESS
[Patient] : patient [FreeTextEntry1] : gait and balance  [FreeTextEntry2] : 61 y.o. male lives in [facility] for past  years. Pt is alert and oriented to time place, person and situation. Dx of  COPD, HTN, DM, High Cholesterol. Has hx of MI 4/4/2011. Follow up on chronic medical issues.  Reported drinking a half pint of vodka yesterday.  Currently denies drinking.  States he is anxious because his TV blew out.  Needs TV to help him sleep.  Facility providing a spear TV. \par \par Patient denies fever, cough, trouble breathing, rash, vomiting and diarrhea. Patient has not been in close contact with someone covid positive.\par \par N95 mask, gloves, eye wear and gown used during visit: [Y]. Total face to face time with patient is 40 min.\par \par \par Patient/ patient's caregiver reports no weight loss >10lbs in the past 6 months. No changes in dentition or swallowing reported, No changes in hearing or vision reported. No changes in Cognition reported. Patient denies any symptoms of depression or anxiety. Patient is ADL and IADL independent. No changes in gait or falls reported. \par Patient's home environment is safe.\par \par

## 2022-11-02 NOTE — CHRONIC CARE ASSESSMENT
[PPS Score: ____] : Palliative Performance Scale (PPS) Score: [unfilled] [FAST Score: ____] : Functional Assessment Scale (FAST) Score: [unfilled] [de-identified] : Risk for falls secondary to ETOH abuse [de-identified] : as tolerated [de-identified] : diabetic diet

## 2022-11-03 ENCOUNTER — APPOINTMENT (OUTPATIENT)
Dept: HOME HEALTH SERVICES | Facility: HOME HEALTH | Age: 62
End: 2022-11-03

## 2022-11-03 ENCOUNTER — NON-APPOINTMENT (OUTPATIENT)
Age: 62
End: 2022-11-03

## 2022-11-17 ENCOUNTER — APPOINTMENT (OUTPATIENT)
Dept: HOME HEALTH SERVICES | Facility: HOME HEALTH | Age: 62
End: 2022-11-17

## 2022-11-17 DIAGNOSIS — E03.9 HYPOTHYROIDISM, UNSPECIFIED: ICD-10-CM

## 2022-11-17 NOTE — COUNSELING
[Overweight - ( BMI 25.1 - 29.9 )] : overweight -  ( BMI 25.1 - 29.9 ) [TLC diet recommended] : TLC diet recommended [Smoker, not interested in quitting] : smoker, not interested in quitting [Smoke/CO Detectors] : smoke/CO detectors [] : foot exam [Decrease stress] : decrease stress [Discussed disease trajectory with patient/caregiver] : discussed disease trajectory with patient/caregiver [Advanced Directives discussed: ____] : Advanced directives discussed: [unfilled] [DNR] : Code Status: DNR [No Limitations] : Treatment Guidelines: No limitations [DNI] : Intubation: DNI [[facility] Date: _____] [facility] Completion/last verification date: [unfilled] [FreeTextEntry4] : Educated patient/legal representative about CCM services and consent.\par Educated patient/legal representative that this service is available because they have 2 or more chronic conditions, that only one Provider can furnish CCM Services per calendar month and that CCM services are subject to the usual Medicare deductible and coinsurance. \par Patient/legal representative understands the right to stop the CCM services at any time by notifying House Calls office.\par Patient/legal representative has verbally consented 10/2021\par \par

## 2022-11-17 NOTE — CHRONIC CARE ASSESSMENT
[PPS Score: ____] : Palliative Performance Scale (PPS) Score: [unfilled] [de-identified] : Risk for falls secondary to ETOH abuse [de-identified] : as tolerated [de-identified] : diabetic diet

## 2022-11-17 NOTE — HISTORY OF PRESENT ILLNESS
[Patient] : patient [FreeTextEntry1] : gait and balance  [FreeTextEntry2] : 61 y.o. male lives in [facility] for past  years. Pt is alert and oriented to time place, person and situation. Dx of  COPD, HTN, DM, High Cholesterol. Has hx of MI 4/4/2011. Follow up on chronic medical issues.  Reported drinking a half pint of vodka yesterday.  Currently denies drinking.  States he is anxious because his TV blew out.  Needs TV to help him sleep.  Facility providing a spear TV. \par \par Patient denies fever, cough, trouble breathing, rash, vomiting and diarrhea. Patient has not been in close contact with someone covid positive.\par \par N95 mask, gloves, eye wear and gown used during visit: [Y]. Total face to face time with patient is 40 min.\par \par \par Patient/ patient's caregiver reports no weight loss >10lbs in the past 6 months. No changes in dentition or swallowing reported, No changes in hearing or vision reported. No changes in Cognition reported. Patient denies any symptoms of depression or anxiety. Patient is ADL and IADL independent. No changes in gait or falls reported. \par Patient's home environment is safe.\par \par

## 2022-11-18 ENCOUNTER — NON-APPOINTMENT (OUTPATIENT)
Age: 62
End: 2022-11-18

## 2022-12-05 ENCOUNTER — RX RENEWAL (OUTPATIENT)
Age: 62
End: 2022-12-05

## 2023-01-12 ENCOUNTER — APPOINTMENT (OUTPATIENT)
Dept: HOME HEALTH SERVICES | Facility: HOME HEALTH | Age: 63
End: 2023-01-12
Payer: MEDICARE

## 2023-01-12 VITALS
SYSTOLIC BLOOD PRESSURE: 122 MMHG | RESPIRATION RATE: 18 BRPM | TEMPERATURE: 97 F | HEART RATE: 70 BPM | DIASTOLIC BLOOD PRESSURE: 70 MMHG | OXYGEN SATURATION: 93 %

## 2023-01-12 PROCEDURE — 99349 HOME/RES VST EST MOD MDM 40: CPT

## 2023-01-12 RX ORDER — FLUTICASONE FUROATE AND VILANTEROL TRIFENATATE 200; 25 UG/1; UG/1
200-25 POWDER RESPIRATORY (INHALATION)
Qty: 1 | Refills: 2 | Status: DISCONTINUED | COMMUNITY
Start: 2021-04-27 | End: 2023-01-12

## 2023-01-12 RX ORDER — UMECLIDINIUM BROMIDE AND VILANTEROL TRIFENATATE 62.5; 25 UG/1; UG/1
62.5-25 POWDER RESPIRATORY (INHALATION)
Qty: 1 | Refills: 5 | Status: DISCONTINUED | COMMUNITY
Start: 2021-04-20 | End: 2023-01-12

## 2023-01-12 NOTE — CHRONIC CARE ASSESSMENT
[PPS Score: ____] : Palliative Performance Scale (PPS) Score: [unfilled] [de-identified] : Risk for falls secondary to ETOH abuse [de-identified] : as tolerated [de-identified] : diabetic diet

## 2023-01-12 NOTE — HISTORY OF PRESENT ILLNESS
[Patient] : patient [FreeTextEntry1] : gait and balance  [FreeTextEntry2] : 61 y.o. male lives in [facility] for past  years. Pt is alert and oriented to time place, person and situation. Dx of  COPD, HTN, DM, High Cholesterol. Has hx of MI 4/4/2011. Follow up on chronic medical issues.  Reported drinking a half pint of vodka yesterday.  Currently denies drinking.  States he is anxious because his TV blew out.  Needs TV to help him sleep.  Facility providing a spear TV. \par \par Patient denies fever, cough, trouble breathing, rash, vomiting and diarrhea. Patient has not been in close contact with someone covid positive.\par \par N95 mask, gloves, eye wear and gown used during visit: [Y]. Total face to face time with patient is 40 min.\par \par \par Patient/ patient's caregiver reports no weight loss >10lbs in the past 6 months. No changes in dentition or swallowing reported, No changes in hearing or vision reported. No changes in Cognition reported. Patient denies any symptoms of depression or anxiety. Patient is ADL and IADL independent. No changes in gait or falls reported. \par Patient's home environment is safe.\par \par

## 2023-01-12 NOTE — COUNSELING
[Overweight - ( BMI 25.1 - 29.9 )] : overweight -  ( BMI 25.1 - 29.9 ) [TLC diet recommended] : TLC diet recommended [Smoker, not interested in quitting] : smoker, not interested in quitting [Smoke/CO Detectors] : smoke/CO detectors [] : foot exam [Decrease stress] : decrease stress [Discussed disease trajectory with patient/caregiver] : discussed disease trajectory with patient/caregiver [Advanced Directives discussed: ____] : Advanced directives discussed: [unfilled] [DNR] : Code Status: DNR [No Limitations] : Treatment Guidelines: No limitations [DNI] : Intubation: DNI [[facility] Date: _____] [facility] Com[facility]/last verification date: [unfilled] [FreeTextEntry4] : Educated patient/legal representative about CCM services and consent.\par Educated patient/legal representative that this service is available because they have 2 or more chronic conditions, that only one Provider can furnish CCM Services per calendar month and that CCM services are subject to the usual Medicare deductible and coinsurance. \par Patient/legal representative understands the right to stop the CCM services at any time by notifying House Calls office.\par Patient/legal representative has verbally consented 10/2021\par \par

## 2023-01-13 ENCOUNTER — LABORATORY RESULT (OUTPATIENT)
Age: 63
End: 2023-01-13

## 2023-01-16 ENCOUNTER — LABORATORY RESULT (OUTPATIENT)
Age: 63
End: 2023-01-16

## 2023-01-18 ENCOUNTER — APPOINTMENT (OUTPATIENT)
Dept: PULMONOLOGY | Facility: CLINIC | Age: 63
End: 2023-01-18

## 2023-01-19 ENCOUNTER — LABORATORY RESULT (OUTPATIENT)
Age: 63
End: 2023-01-19

## 2023-01-19 ENCOUNTER — NON-APPOINTMENT (OUTPATIENT)
Age: 63
End: 2023-01-19

## 2023-01-20 ENCOUNTER — LABORATORY RESULT (OUTPATIENT)
Age: 63
End: 2023-01-20

## 2023-01-25 NOTE — COUNSELING
[Overweight - ( BMI 25.1 - 29.9 )] : overweight -  ( BMI 25.1 - 29.9 ) [TLC diet recommended] : TLC diet recommended [Smoker, not interested in quitting] : smoker, not interested in quitting [Smoke/CO Detectors] : smoke/CO detectors [] : foot exam [Decrease stress] : decrease stress [Discussed disease trajectory with patient/caregiver] : discussed disease trajectory with patient/caregiver [Advanced Directives discussed: ____] : Advanced directives discussed: [unfilled] [DNR] : Code Status: DNR [No Limitations] : Treatment Guidelines: No limitations [DNI] : Intubation: DNI [[facility] Date: _____] [facility] Completion/last verification date: [unfilled] [FreeTextEntry4] : Educated patient/legal representative about CCM services and consent.\par Educated patient/legal representative that this service is available because they have 2 or more chronic conditions, that only one Provider can furnish CCM Services per calendar month and that CCM services are subject to the usual Medicare deductible and coinsurance. \par Patient/legal representative understands the right to stop the CCM services at any time by notifying House Calls office.\par Patient/legal representative has verbally consented 10/2021\par \par

## 2023-01-25 NOTE — HISTORY OF PRESENT ILLNESS
[Patient] : patient [FreeTextEntry1] : gait and balance  [FreeTextEntry2] : 61 y.o. male lives in [facility] for past  years. Pt is alert and oriented to time place, person and situation. Dx of  COPD, HTN, DM, High Cholesterol. Has hx of MI 4/4/2011. Follow up on chronic medical issues.  Reported drinking a half pint of vodka yesterday.  Currently denies drinking.  States he is anxious because his TV blew out.  Needs TV to help him sleep.  Facility providing a spear TV. \par \par Patient denies fever, cough, trouble breathing, rash, vomiting and diarrhea. Patient has not been in close contact with someone covid positive.\par \par N95 mask, gloves, eye wear and gown used during visit: [Y]. Total face to face time with patient is 40 min.\par \par \par Patient/ patient's caregiver reports no weight loss >10lbs in the past 6 months. No changes in dentition or swallowing reported, No changes in hearing or vision reported. No changes in Cognition reported. Patient denies any symptoms of depression or anxiety. Patient is ADL and IADL independent. No changes in gait or falls reported. \par Patient's home environment is safe.\par \par

## 2023-01-25 NOTE — CHRONIC CARE ASSESSMENT
[PPS Score: ____] : Palliative Performance Scale (PPS) Score: [unfilled] [FAST Score: ____] : Functional Assessment Scale (FAST) Score: [unfilled] [de-identified] : Risk for falls secondary to ETOH abuse [de-identified] : as tolerated [de-identified] : diabetic diet

## 2023-01-26 ENCOUNTER — APPOINTMENT (OUTPATIENT)
Dept: HOME HEALTH SERVICES | Facility: HOME HEALTH | Age: 63
End: 2023-01-26

## 2023-02-23 ENCOUNTER — APPOINTMENT (OUTPATIENT)
Dept: HOME HEALTH SERVICES | Facility: HOME HEALTH | Age: 63
End: 2023-02-23
Payer: MEDICARE

## 2023-02-23 VITALS
HEART RATE: 70 BPM | SYSTOLIC BLOOD PRESSURE: 124 MMHG | OXYGEN SATURATION: 94 % | DIASTOLIC BLOOD PRESSURE: 70 MMHG | RESPIRATION RATE: 18 BRPM | TEMPERATURE: 97 F

## 2023-02-23 DIAGNOSIS — R32 UNSPECIFIED URINARY INCONTINENCE: ICD-10-CM

## 2023-02-23 DIAGNOSIS — J30.9 ALLERGIC RHINITIS, UNSPECIFIED: ICD-10-CM

## 2023-02-23 PROCEDURE — 99349 HOME/RES VST EST MOD MDM 40: CPT

## 2023-02-23 NOTE — HISTORY OF PRESENT ILLNESS
[Patient] : patient [LastPCPVisitDate] : 2/23/23 [FreeTextEntry1] : gait and balance  [FreeTextEntry2] : 61 y.o. male lives in [facility] for past  years. Pt is alert and oriented to time place, person and situation. Dx of  COPD, HTN, DM, High Cholesterol. Has hx of MI 4/4/2011. Follow up on chronic medical issues.  Reported drinking a half pint of vodka yesterday.  Currently denies drinking.  States he is anxious because his TV blew out.  Needs TV to help him sleep.  Facility providing a spear TV.  Reports frequent urination.  Drinks a lot of water ? 1 gallon a day. notes rhinitis will trial nasal spray.  \par \par Patient denies fever, cough, trouble breathing, rash, vomiting and diarrhea. Patient has not been in close contact with someone covid positive.\par \par N95 mask, gloves, eye wear and gown used during visit: [Y]. Total face to face time with patient is 40 min.\par \par \par Patient/ patient's caregiver reports no weight loss >10lbs in the past 6 months. No changes in dentition or swallowing reported, No changes in hearing or vision reported. No changes in Cognition reported. Patient denies any symptoms of depression or anxiety. Patient is ADL and IADL independent. No changes in gait or falls reported. \par Patient's home environment is safe.\par \par

## 2023-02-23 NOTE — COUNSELING
[Overweight - ( BMI 25.1 - 29.9 )] : overweight -  ( BMI 25.1 - 29.9 ) [TLC diet recommended] : TLC diet recommended [Smoker, not interested in quitting] : smoker, not interested in quitting [Smoke/CO Detectors] : smoke/CO detectors [] : foot exam [Decrease stress] : decrease stress [Discussed disease trajectory with patient/caregiver] : discussed disease trajectory with patient/caregiver [Advanced Directives discussed: ____] : Advanced directives discussed: [unfilled] [DNR] : Code Status: DNR [No Limitations] : Treatment Guidelines: No limitations [DNI] : Intubation: DNI [Last [facility] Date: _____] : [facility] Completion/last verification date: [unfilled] [FreeTextEntry4] : Educated patient/legal representative about CCM services and consent.\par Educated patient/legal representative that this service is available because they have 2 or more chronic conditions, that only one Provider can furnish CCM Services per calendar month and that CCM services are subject to the usual Medicare deductible and coinsurance. \par Patient/legal representative understands the right to stop the CCM services at any time by notifying House Calls office.\par Patient/legal representative has verbally consented 10/2021\par \par

## 2023-02-23 NOTE — CHRONIC CARE ASSESSMENT
[PPS Score: ____] : Palliative Performance Scale (PPS) Score: [unfilled] [de-identified] : Risk for falls secondary to ETOH abuse [de-identified] : as tolerated [de-identified] : diabetic diet

## 2023-04-19 NOTE — HISTORY OF PRESENT ILLNESS
[LastPCPVisitDate] : 2/23/23 [Patient] : patient [FreeTextEntry1] : gait and balance  [FreeTextEntry2] : 61 y.o. male lives in [facility] for past  years. Pt is alert and oriented to time place, person and situation. Dx of  COPD, HTN, DM, High Cholesterol. Has hx of MI 4/4/2011. Follow up on chronic medical issues.  Reported drinking a half pint of vodka yesterday.  Currently denies drinking.  States he is anxious because his TV blew out.  Needs TV to help him sleep.  Facility providing a spear TV.  Reports frequent urination.  Drinks a lot of water ? 1 gallon a day. notes rhinitis will trial nasal spray.  \par \par Patient denies fever, cough, trouble breathing, rash, vomiting and diarrhea. Patient has not been in close contact with someone covid positive.\par \par N95 mask, gloves, eye wear and gown used during visit: [Y]. Total face to face time with patient is 40 min.\par \par \par Patient/ patient's caregiver reports no weight loss >10lbs in the past 6 months. No changes in dentition or swallowing reported, No changes in hearing or vision reported. No changes in Cognition reported. Patient denies any symptoms of depression or anxiety. Patient is ADL and IADL independent. No changes in gait or falls reported. \par Patient's home environment is safe.\par \par

## 2023-04-19 NOTE — COUNSELING
[Overweight - ( BMI 25.1 - 29.9 )] : overweight -  ( BMI 25.1 - 29.9 ) [TLC diet recommended] : TLC diet recommended [Smoker, not interested in quitting] : smoker, not interested in quitting [Smoke/CO Detectors] : smoke/CO detectors [] : foot exam [Decrease stress] : decrease stress [Discussed disease trajectory with patient/caregiver] : discussed disease trajectory with patient/caregiver [Advanced Directives discussed: ____] : Advanced directives discussed: [unfilled] [DNR] : Code Status: DNR [No Limitations] : Treatment Guidelines: No limitations [DNI] : Intubation: DNI [[facility] Date: _____] [facility] Completion/last verification date: [unfilled] [FreeTextEntry4] : Educated patient/legal representative about CCM services and consent.\par Educated patient/legal representative that this service is available because they have 2 or more chronic conditions, that only one Provider can furnish CCM Services per calendar month and that CCM services are subject to the usual Medicare deductible and coinsurance. \par Patient/legal representative understands the right to stop the CCM services at any time by notifying House Calls office.\par Patient/legal representative has verbally consented 10/2021\par \par

## 2023-04-19 NOTE — CHRONIC CARE ASSESSMENT
[PPS Score: ____] : Palliative Performance Scale (PPS) Score: [unfilled] [de-identified] : Risk for falls secondary to ETOH abuse [de-identified] : as tolerated [de-identified] : diabetic diet

## 2023-04-20 ENCOUNTER — APPOINTMENT (OUTPATIENT)
Dept: HOME HEALTH SERVICES | Facility: HOME HEALTH | Age: 63
End: 2023-04-20

## 2023-04-20 VITALS
RESPIRATION RATE: 18 BRPM | TEMPERATURE: 97 F | SYSTOLIC BLOOD PRESSURE: 130 MMHG | DIASTOLIC BLOOD PRESSURE: 70 MMHG | OXYGEN SATURATION: 95 % | HEART RATE: 70 BPM

## 2023-05-17 NOTE — HISTORY OF PRESENT ILLNESS
[Patient] : patient [LastPCPVisitDate] : 2/23/23 [FreeTextEntry1] : gait and balance  [FreeTextEntry2] : 61 y.o. male lives in [facility] for past  years. Pt is alert and oriented to time place, person and situation. Dx of  COPD, HTN, DM, High Cholesterol. Has hx of MI 4/4/2011. Follow up on chronic medical issues.  Reported drinking a half pint of vodka yesterday.  Currently denies drinking.  States he is anxious because his TV blew out.  Needs TV to help him sleep.  Facility providing a spear TV.  Reports frequent urination.  Drinks a lot of water ? 1 gallon a day. notes rhinitis will trial nasal spray.  \par \par Patient denies fever, cough, trouble breathing, rash, vomiting and diarrhea. Patient has not been in close contact with someone covid positive.\par \par N95 mask, gloves, eye wear and gown used during visit: [Y]. Total face to face time with patient is 40 min.\par \par \par Patient/ patient's caregiver reports no weight loss >10lbs in the past 6 months. No changes in dentition or swallowing reported, No changes in hearing or vision reported. No changes in Cognition reported. Patient denies any symptoms of depression or anxiety. Patient is ADL and IADL independent. No changes in gait or falls reported. \par Patient's home environment is safe.\par \par

## 2023-05-17 NOTE — CHRONIC CARE ASSESSMENT
[PPS Score: ____] : Palliative Performance Scale (PPS) Score: [unfilled] [de-identified] : Risk for falls secondary to ETOH abuse [de-identified] : as tolerated [de-identified] : diabetic diet

## 2023-05-18 ENCOUNTER — APPOINTMENT (OUTPATIENT)
Dept: HOME HEALTH SERVICES | Facility: HOME HEALTH | Age: 63
End: 2023-05-18

## 2023-06-01 ENCOUNTER — APPOINTMENT (OUTPATIENT)
Dept: HOME HEALTH SERVICES | Facility: HOME HEALTH | Age: 63
End: 2023-06-01
Payer: MEDICARE

## 2023-06-01 VITALS
TEMPERATURE: 97 F | RESPIRATION RATE: 18 BRPM | SYSTOLIC BLOOD PRESSURE: 130 MMHG | OXYGEN SATURATION: 96 % | HEART RATE: 70 BPM | DIASTOLIC BLOOD PRESSURE: 70 MMHG

## 2023-06-01 PROCEDURE — 99349 HOME/RES VST EST MOD MDM 40: CPT
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
## 2023-06-01 NOTE — CHRONIC CARE ASSESSMENT
[PPS Score: ____] : Palliative Performance Scale (PPS) Score: [unfilled] [de-identified] : Risk for falls secondary to ETOH abuse [de-identified] : as tolerated [de-identified] : diabetic diet

## 2023-06-04 LAB
ALBUMIN SERPL ELPH-MCNC: 5 G/DL
ALP BLD-CCNC: 145 U/L
ALT SERPL-CCNC: 43 U/L
ANION GAP SERPL CALC-SCNC: 18 MMOL/L
AST SERPL-CCNC: 33 U/L
BILIRUB SERPL-MCNC: 0.5 MG/DL
BUN SERPL-MCNC: 12 MG/DL
CALCIUM SERPL-MCNC: 10.1 MG/DL
CHLORIDE SERPL-SCNC: 103 MMOL/L
CHOLEST SERPL-MCNC: 263 MG/DL
CO2 SERPL-SCNC: 22 MMOL/L
CREAT SERPL-MCNC: 1.04 MG/DL
EGFR: 81 ML/MIN/1.73M2
ESTIMATED AVERAGE GLUCOSE: 120 MG/DL
GLUCOSE SERPL-MCNC: 101 MG/DL
HBA1C MFR BLD HPLC: 5.8 %
HDLC SERPL-MCNC: 67 MG/DL
LDLC SERPL CALC-MCNC: 147 MG/DL
NONHDLC SERPL-MCNC: 196 MG/DL
POTASSIUM SERPL-SCNC: 4.2 MMOL/L
PROT SERPL-MCNC: 7.8 G/DL
SODIUM SERPL-SCNC: 143 MMOL/L
TRIGL SERPL-MCNC: 243 MG/DL
TSH SERPL-ACNC: 0.83 UIU/ML

## 2023-06-22 ENCOUNTER — NON-APPOINTMENT (OUTPATIENT)
Age: 63
End: 2023-06-22

## 2023-07-13 ENCOUNTER — APPOINTMENT (OUTPATIENT)
Dept: HOME HEALTH SERVICES | Facility: HOME HEALTH | Age: 63
End: 2023-07-13
Payer: MEDICARE

## 2023-07-13 VITALS
HEART RATE: 70 BPM | RESPIRATION RATE: 18 BRPM | DIASTOLIC BLOOD PRESSURE: 70 MMHG | TEMPERATURE: 97 F | OXYGEN SATURATION: 93 % | SYSTOLIC BLOOD PRESSURE: 130 MMHG

## 2023-07-13 DIAGNOSIS — R13.12 DYSPHAGIA, OROPHARYNGEAL PHASE: ICD-10-CM

## 2023-07-13 PROCEDURE — 99349 HOME/RES VST EST MOD MDM 40: CPT

## 2023-07-13 RX ORDER — ATORVASTATIN CALCIUM 40 MG/1
40 TABLET, FILM COATED ORAL
Qty: 90 | Refills: 3 | Status: ACTIVE | COMMUNITY
Start: 2021-01-08 | End: 1900-01-01

## 2023-07-13 NOTE — HISTORY OF PRESENT ILLNESS
[Patient] : patient [LastPCPVisitDate] : 2/23/23 [FreeTextEntry1] : gait and balance  [FreeTextEntry2] : 62 y.o. male lives in [facility] for past  years. Pt is alert and oriented to time place, person and situation. Dx of  COPD, HTN, DM, High Cholesterol. Has hx of MI 4/4/2011. Follow up on chronic medical issues. Reports dysphagia sometimes feels he has trouble swallowing his own saliva.  Recommended ENT referral.  Smoking [place] feels he is cutting back- not willing to quit.  \par \par Patient denies fever, cough, trouble breathing, rash, vomiting and diarrhea. Patient has not been in close contact with someone covid positive.\par \par N95 mask, gloves, eye wear and gown used during visit: [Y]. Total face to face time with patient is 40 min.\par \par \par Patient/ patient's caregiver reports no weight loss >10lbs in the past 6 months. No changes in dentition or swallowing reported, No changes in hearing or vision reported. No changes in Cognition reported. Patient denies any symptoms of depression or anxiety. Patient is ADL and IADL independent. No changes in gait or falls reported. \par Patient's home environment is safe.\par \par

## 2023-07-13 NOTE — CHRONIC CARE ASSESSMENT
[PPS Score: ____] : Palliative Performance Scale (PPS) Score: [unfilled] [de-identified] : Risk for falls secondary to ETOH abuse [de-identified] : as tolerated [de-identified] : diabetic diet

## 2023-07-13 NOTE — COUNSELING
[Overweight - ( BMI 25.1 - 29.9 )] : overweight -  ( BMI 25.1 - 29.9 ) [TLC diet recommended] : TLC diet recommended [Smoker, not interested in quitting] : smoker, not interested in quitting [Smoke/CO Detectors] : smoke/CO detectors [] : foot exam [Decrease stress] : decrease stress [Discussed disease trajectory with patient/caregiver] : discussed disease trajectory with patient/caregiver [Advanced Directives discussed: ____] : Advanced directives discussed: [unfilled] [DNR] : Code Status: DNR [No Limitations] : Treatment Guidelines: No limitations [DNI] : Intubation: DNI [[facility] Date: _____[facility]/last verification date: [unfilled] [FreeTextEntry4] : Educated patient/legal representative about CCM services and consent.\par Educated patient/legal representative that this service is available because they have 2 or more chronic conditions, that only one Provider can furnish CCM Services per calendar month and that CCM services are subject to the usual Medicare deductible and coinsurance. \par Patient/legal representative understands the right to stop the CCM services at any time by notifying House Calls office.\par Patient/legal representative has verbally consented 10/2021\par \par

## 2023-07-27 NOTE — COUNSELING
[Normal Weight - ( BMI  <25 )] : normal weight - ( BMI  <25 ) [Sodium restriction 2gm recommended] : sodium restriction 2 gm recommended [Smoker, not interested in quitting] : smoker, not interested in quitting no bleeding/no pain

## 2023-08-13 ENCOUNTER — NON-APPOINTMENT (OUTPATIENT)
Age: 63
End: 2023-08-13

## 2023-08-13 ENCOUNTER — EMERGENCY (EMERGENCY)
Facility: HOSPITAL | Age: 63
LOS: 1 days | Discharge: HOME | End: 2023-08-13
Attending: EMERGENCY MEDICINE | Admitting: EMERGENCY MEDICINE
Payer: MEDICARE

## 2023-08-13 VITALS
RESPIRATION RATE: 15 BRPM | OXYGEN SATURATION: 100 % | SYSTOLIC BLOOD PRESSURE: 103 MMHG | DIASTOLIC BLOOD PRESSURE: 78 MMHG | TEMPERATURE: 98 F | HEART RATE: 98 BPM

## 2023-08-13 PROCEDURE — 99283 EMERGENCY DEPT VISIT LOW MDM: CPT

## 2023-08-13 PROCEDURE — 99285 EMERGENCY DEPT VISIT HI MDM: CPT

## 2023-08-13 NOTE — ED ADULT NURSE NOTE - ALCOHOL PRE SCREEN (AUDIT - C)
Refill Atorvastatin    LV  4/1/19  NV  10/17/19  LF   8/13/18    Please review and advise Statement Selected

## 2023-08-13 NOTE — ED PROVIDER NOTE - CLINICAL SUMMARY MEDICAL DECISION MAKING FREE TEXT BOX
admits to drinking alcohol tonight, unremarkable medical screening exam. pt given food. states would like to go home and needs a metrocard    I have discussed the discharge plan with the patient. The patient agrees with the plan, as discussed.  The patient understands Emergency Department diagnosis is a preliminary diagnosis often based on limited information and that the patient must adhere to the follow-up plan as discussed.  The patient understands that if the symptoms worsen the patient may return to the Emergency Department at any time for further evaluation and treatment.

## 2023-08-13 NOTE — ED ADULT NURSE NOTE - OBJECTIVE STATEMENT
62y M presenting to ED endorsing "I am here for everything but enrique alcohol." I was also at [facility] and lost all my belongings.

## 2023-08-13 NOTE — ED PROVIDER NOTE - OBJECTIVE STATEMENT
62M hx copd, anxiety, high chol, c/o feeling unwell tonight. pt admits to drinking alcohol today. [facility] needs a place to sit. [facility] was at [facility] prior to this visit and they discharged him. no falls. no vomiting. no chest pain, no SOB. no fevers.

## 2023-08-13 NOTE — ED PROVIDER NOTE - NSFOLLOWUPINSTRUCTIONS_ED_ALL_ED_FT
Alcohol Intoxication    WHAT YOU NEED TO KNOW:    Alcohol intoxication is a harmful physical condition caused when you drink more alcohol than your body can handle. It is also called ethanol poisoning, or being drunk.    DISCHARGE INSTRUCTIONS:    Call your local emergency number (911 in the ) if:    You have sudden trouble breathing or chest pain.    You have a seizure.    You feel sad enough to harm yourself or others.  Call your doctor if:    You have hallucinations (you see or hear things that are not real).    You cannot stop vomiting.    You have questions or concerns about your condition or care.  Recommended alcohol limits:    Men 21 to 64 years should limit alcohol to 2 drinks a day. Do not have more than 4 drinks in 1 day or more than 14 in 1 week.    All women, and men 65 or older should limit alcohol to 1 drink in a day. Do not have more than 3 drinks in 1 day or more than 7 in 1 week. No amount of alcohol is okay during pregnancy.  Manage alcohol use:    Decrease the amount you drink. This can help prevent health problems such as brain, heart, and liver damage, high blood pressure, diabetes, and cancer. If you cannot stop completely, healthcare providers can help you set goals to decrease the amount you drink.    Plan weekly alcohol use. You will be less likely to drink more than the recommended limit if you plan ahead.    Have food when you drink alcohol. Food will prevent alcohol from getting into your system too quickly. Eat before you have your first alcohol drink.    Time your drinks carefully. Have no more than 1 drink in an hour. Have a liquid such as water, coffee, or a soft drink between alcohol drinks.    Do not drive if you have had alcohol. Make sure someone who has not been drinking can help you get home.    Do not drink alcohol if you are taking medicine. Alcohol is dangerous when you combine it with certain medicines, such as acetaminophen or blood pressure medicine. Talk to your healthcare provider about all the medicines you currently take.  For more information:    Alcoholics Anonymous  Web Address: http://www.aa.org  Substance Abuse and Mental Health Services Administration  [address]  [place] [address]  Web Address: http://www.sama.gov  Follow up with your doctor as directed: Write down your questions so you remember to ask them during your visits.

## 2023-08-13 NOTE — ED ADULT TRIAGE NOTE - CHIEF COMPLAINT QUOTE
"I partied a little to hard tonight - y'know how it is. I just need a place to sit and put my feet up. I just need some water and ice too."

## 2023-08-13 NOTE — ED ADULT NURSE NOTE - NSICDXPASTMEDICALHX_GEN_ALL_CORE_FT
PAST MEDICAL HISTORY:  Anxiety     Chronic back pain greater than 3 months duration     COPD (chronic obstructive pulmonary disease)     High cholesterol     Insomnia

## 2023-08-13 NOTE — ED ADULT NURSE NOTE - NSFALLUNIVINTERV_ED_ALL_ED
Bed/Stretcher in lowest position, wheels locked, appropriate side rails in place/Call bell, personal items and telephone in reach/Instruct patient to call for assistance before getting out of bed/chair/stretcher/Non-slip footwear applied when patient is off stretcher/[place] to call system/Physically safe environment - no spills, clutter or unnecessary equipment/Purposeful proactive rounding/Room/bathroom lighting operational, light cord in reach

## 2023-08-13 NOTE — ED ADULT TRIAGE NOTE - ARRIVAL INFO ADDITIONAL COMMENTS
Patient awake, alert, verbally responsive. Admits to alcohol consumption. Denies fainting or near syncope. Requesting a chair and water. Able to ambulate at baseline capacity per patient report and correlation - cooperative and agreeable. No reported headaches, anxiety, tremors, hallucinations, or seizure-like activity.

## 2023-08-13 NOTE — ED PROVIDER NOTE - PATIENT PORTAL LINK FT
You can access the FollowMyHealth Patient Portal offered by [facility] by registering at the following website: [email or web address]. By joining [employer]’s FollowMyHealth portal, you will also be able to view your health information using other applications (apps) compatible with our system.

## 2023-08-14 ENCOUNTER — APPOINTMENT (OUTPATIENT)
Dept: HOME HEALTH SERVICES | Facility: HOME HEALTH | Age: 63
End: 2023-08-14

## 2023-08-14 DIAGNOSIS — F41.9 ANXIETY DISORDER, UNSPECIFIED: ICD-10-CM

## 2023-08-14 DIAGNOSIS — G89.29 OTHER CHRONIC PAIN: ICD-10-CM

## 2023-08-14 DIAGNOSIS — J44.9 CHRONIC OBSTRUCTIVE PULMONARY DISEASE, UNSPECIFIED: ICD-10-CM

## 2023-08-14 DIAGNOSIS — F10.129 ALCOHOL ABUSE WITH INTOXICATION, UNSPECIFIED: ICD-10-CM

## 2023-08-14 DIAGNOSIS — Z79.82 LONG TERM (CURRENT) USE OF ASPIRIN: ICD-10-CM

## 2023-08-14 DIAGNOSIS — F17.200 NICOTINE DEPENDENCE, UNSPECIFIED, UNCOMPLICATED: ICD-10-CM

## 2023-08-14 DIAGNOSIS — E78.00 PURE HYPERCHOLESTEROLEMIA, UNSPECIFIED: ICD-10-CM

## 2023-08-14 DIAGNOSIS — Z91.013 ALLERGY TO SEAFOOD: ICD-10-CM

## 2023-08-14 DIAGNOSIS — F10.90 ALCOHOL USE, UNSPECIFIED, UNCOMPLICATED: ICD-10-CM

## 2023-08-14 DIAGNOSIS — M54.9 DORSALGIA, UNSPECIFIED: ICD-10-CM

## 2023-09-07 ENCOUNTER — APPOINTMENT (OUTPATIENT)
Dept: HOME HEALTH SERVICES | Facility: HOME HEALTH | Age: 63
End: 2023-09-07
Payer: MEDICARE

## 2023-09-07 VITALS
RESPIRATION RATE: 18 BRPM | OXYGEN SATURATION: 96 % | HEART RATE: 70 BPM | TEMPERATURE: 97 F | SYSTOLIC BLOOD PRESSURE: 130 MMHG | DIASTOLIC BLOOD PRESSURE: 70 MMHG

## 2023-09-07 PROCEDURE — 99348 HOME/RES VST EST LOW MDM 30: CPT

## 2023-09-07 NOTE — CHRONIC CARE ASSESSMENT
[PPS Score: ____] : Palliative Performance Scale (PPS) Score: [unfilled] [de-identified] : Risk for falls secondary to ETOH abuse [de-identified] : as tolerated [de-identified] : diabetic diet

## 2023-09-07 NOTE — HISTORY OF PRESENT ILLNESS
[Patient] : patient [LastPCPVisitDate] : 2/23/23 [FreeTextEntry1] : gait and balance  [FreeTextEntry2] : 62 y.o. male lives in [facility] for past years. Pt is alert and oriented to time place, person and situation. Dx of  COPD, HTN, DM, High Cholesterol. Has hx of MI 4/4/2011. Worried about his eye ofsferred a second opinion appointment with an eye specialist.   Patient denies fever, cough, trouble breathing, rash, vomiting and diarrhea. Patient has not been in close contact with someone covid positive.  N95 mask, gloves, eye wear and gown used during visit: [Y]. Total face to face time with patient is 40 min.   Patient/ patient's caregiver reports no weight loss >10lbs in the past 6 months. No changes in dentition or swallowing reported, No changes in hearing or vision reported. No changes in Cognition reported. Patient denies any symptoms of depression or anxiety. Patient is ADL and IADL independent. No changes in gait or falls reported.  Patient's home environment is safe.

## 2023-09-07 NOTE — COUNSELING
[Overweight - ( BMI 25.1 - 29.9 )] : overweight -  ( BMI 25.1 - 29.9 ) [TLC diet recommended] : TLC diet recommended [Smoker, not interested in quitting] : smoker, not interested in quitting [Smoke/CO Detectors] : smoke/CO detectors [] : foot exam [Decrease stress] : decrease stress [Discussed disease trajectory with patient/caregiver] : discussed disease trajectory with patient/caregiver [Advanced Directives discussed: ____] : Advanced directives discussed: [unfilled] [DNR] : Code Status: DNR [No Limitations] : Treatment Guidelines: No limitations [DNI] : Intubation: DNI [[facility] Date: _____] [facility] Com[facility]/last verification date: [unfilled] [FreeTextEntry4] : Educated patient/legal representative about CCM services and consent.\par  Educated patient/legal representative that this service is available because they have 2 or more chronic conditions, that only one Provider can furnish CCM Services per calendar month and that CCM services are subject to the usual Medicare deductible and coinsurance. \par  Patient/legal representative understands the right to stop the CCM services at any time by notifying House Calls office.\par  Patient/legal representative has verbally consented 10/2021\par  \par

## 2023-11-30 ENCOUNTER — APPOINTMENT (OUTPATIENT)
Dept: HOME HEALTH SERVICES | Facility: HOME HEALTH | Age: 63
End: 2023-11-30
Payer: MEDICARE

## 2023-11-30 VITALS
HEART RATE: 70 BPM | RESPIRATION RATE: 18 BRPM | OXYGEN SATURATION: 96 % | DIASTOLIC BLOOD PRESSURE: 70 MMHG | TEMPERATURE: 97 F | SYSTOLIC BLOOD PRESSURE: 128 MMHG

## 2023-11-30 PROCEDURE — 99349 HOME/RES VST EST MOD MDM 40: CPT

## 2023-11-30 RX ORDER — SILDENAFIL 100 MG/1
100 TABLET, FILM COATED ORAL
Qty: 1 | Refills: 11 | Status: DISCONTINUED | COMMUNITY
Start: 2023-01-12 | End: 2023-11-30

## 2023-11-30 RX ORDER — SILDENAFIL 100 MG/1
100 TABLET, FILM COATED ORAL
Qty: 1 | Refills: 11 | Status: DISCONTINUED | COMMUNITY
Start: 2021-11-04 | End: 2023-11-30

## 2023-12-03 LAB
ALBUMIN SERPL ELPH-MCNC: 4.9 G/DL
ALP BLD-CCNC: 136 U/L
ALT SERPL-CCNC: 44 U/L
ANION GAP SERPL CALC-SCNC: 16 MMOL/L
AST SERPL-CCNC: 39 U/L
BILIRUB SERPL-MCNC: 0.5 MG/DL
BUN SERPL-MCNC: 12 MG/DL
CALCIUM SERPL-MCNC: 9.5 MG/DL
CHLORIDE SERPL-SCNC: 97 MMOL/L
CHOLEST SERPL-MCNC: 253 MG/DL
CO2 SERPL-SCNC: 26 MMOL/L
CREAT SERPL-MCNC: 1.02 MG/DL
EGFR: 82 ML/MIN/1.73M2
ESTIMATED AVERAGE GLUCOSE: 111 MG/DL
GLUCOSE SERPL-MCNC: 102 MG/DL
HBA1C MFR BLD HPLC: 5.5 %
HDLC SERPL-MCNC: 87 MG/DL
LDLC SERPL CALC-MCNC: 146 MG/DL
NONHDLC SERPL-MCNC: 166 MG/DL
POTASSIUM SERPL-SCNC: 4.3 MMOL/L
PROT SERPL-MCNC: 7.5 G/DL
SODIUM SERPL-SCNC: 140 MMOL/L
TRIGL SERPL-MCNC: 113 MG/DL
TSH SERPL-ACNC: 1.27 UIU/ML

## 2024-01-11 ENCOUNTER — APPOINTMENT (OUTPATIENT)
Dept: HOME HEALTH SERVICES | Facility: HOME HEALTH | Age: 64
End: 2024-01-11
Payer: MEDICARE

## 2024-01-11 VITALS
RESPIRATION RATE: 18 BRPM | OXYGEN SATURATION: 95 % | SYSTOLIC BLOOD PRESSURE: 120 MMHG | TEMPERATURE: 97 F | DIASTOLIC BLOOD PRESSURE: 70 MMHG | HEART RATE: 70 BPM

## 2024-01-11 PROCEDURE — G0506: CPT

## 2024-01-11 PROCEDURE — 99349 HOME/RES VST EST MOD MDM 40: CPT

## 2024-01-11 RX ORDER — PREDNISONE 20 MG/1
20 TABLET ORAL
Qty: 7 | Refills: 0 | Status: ACTIVE | COMMUNITY
Start: 2024-01-11 | End: 1900-01-01

## 2024-01-11 NOTE — CHRONIC CARE ASSESSMENT
[PPS Score: ____] : Palliative Performance Scale (PPS) Score: [unfilled] [de-identified] : Risk for falls secondary to ETOH abuse [de-identified] : as tolerated [de-identified] : diabetic diet

## 2024-01-11 NOTE — HEALTH RISK ASSESSMENT
[HRA Reviewed] : Health risk assessment reviewed [Independent] : managing finances [No falls in past year] : Patient reported no falls in the past year [Yes] : The patient does have visual impairment [TimeGetUpGo] : 20 [de-identified] : ambulated independently with some assistance

## 2024-01-11 NOTE — COUNSELING
[Overweight - ( BMI 25.1 - 29.9 )] : overweight -  ( BMI 25.1 - 29.9 ) [TLC diet recommended] : TLC diet recommended [Smoker, not interested in quitting] : smoker, not interested in quitting [Smoke/CO Detectors] : smoke/CO detectors [] : foot exam [Decrease stress] : decrease stress [Discussed disease trajectory with patient/caregiver] : discussed disease trajectory with patient/caregiver [Advanced Directives discussed: ____] : Advanced directives discussed: [unfilled] [DNR] : Code Status: DNR [No Limitations] : Treatment Guidelines: No limitations [DNI] : Intubation: DNI [Last Verification Date: _____] : [facility] Completion/last verification date: [unfilled] [ - New patient with 2 or more chronic conditions; CCM discussed and patient-centered care plan established] : New patient with 2 or more chronic conditions; CCM discussed and patient-centered care plan established [FreeTextEntry4] : Educated patient/legal representative about CCM services and consent.\par  Educated patient/legal representative that this service is available because they have 2 or more chronic conditions, that only one Provider can furnish CCM Services per calendar month and that CCM services are subject to the usual Medicare deductible and coinsurance. \par  Patient/legal representative understands the right to stop the CCM services at any time by notifying House Calls office.\par  Patient/legal representative has verbally consented 10/2021\par  \par

## 2024-01-11 NOTE — HISTORY OF PRESENT ILLNESS
[Patient] : patient [LastPCPVisitDate] : 2/23/23 [FreeTextEntry1] : gait and balance  [FreeTextEntry2] : 63 y.o. male with COPD, HTN, DM, High Cholesterol. Has hx of MI 4/4/2011. Reports dsypnea on exertion.  States even getting dressed is difficult.  Continues to smoke and drink.  Say he is cutting back switched to lights.  Renewed medications which he has not been using.  Labs and chest x-ray.  Consider pulmonary consult.    Patient denies fever, cough, trouble breathing, rash, vomiting and diarrhea. Patient has not been in close contact with someone covid positive.  N95 mask, gloves, eye wear and gown used during visit: [Y]. Total face to face time with patient is 40 min.   Patient/ patient's caregiver reports no weight loss >10lbs in the past 6 months. No changes in dentition or swallowing reported, No changes in hearing or vision reported. No changes in Cognition reported. Patient denies any symptoms of depression or anxiety. Patient is ADL and IADL independent. No changes in gait or falls reported.  Patient's home environment is safe.

## 2024-01-12 LAB
ALBUMIN SERPL ELPH-MCNC: 3.9 G/DL
ALP BLD-CCNC: 135 U/L
ALT SERPL-CCNC: 17 U/L
ANION GAP SERPL CALC-SCNC: 16 MMOL/L
AST SERPL-CCNC: 18 U/L
BILIRUB SERPL-MCNC: 0.2 MG/DL
BUN SERPL-MCNC: 11 MG/DL
CALCIUM SERPL-MCNC: 10 MG/DL
CHLORIDE SERPL-SCNC: 96 MMOL/L
CO2 SERPL-SCNC: 27 MMOL/L
CREAT SERPL-MCNC: 0.82 MG/DL
EGFR: 98 ML/MIN/1.73M2
GLUCOSE SERPL-MCNC: 98 MG/DL
HCT VFR BLD CALC: 39.8 %
HGB BLD-MCNC: 13.2 G/DL
MCHC RBC-ENTMCNC: 32.9 PG
MCHC RBC-ENTMCNC: 33.2 GM/DL
MCV RBC AUTO: 99.3 FL
PLATELET # BLD AUTO: 366 K/UL
POTASSIUM SERPL-SCNC: 4.9 MMOL/L
PROT SERPL-MCNC: 6.9 G/DL
RBC # BLD: 4.01 M/UL
RBC # FLD: 12.5 %
SODIUM SERPL-SCNC: 139 MMOL/L
TSH SERPL-ACNC: 0.45 UIU/ML
WBC # FLD AUTO: 8.54 K/UL

## 2024-01-13 ENCOUNTER — TRANSCRIPTION ENCOUNTER (OUTPATIENT)
Age: 64
End: 2024-01-13

## 2024-01-13 LAB
ESTIMATED AVERAGE GLUCOSE: 126 MG/DL
HBA1C MFR BLD HPLC: 6 %

## 2024-01-25 ENCOUNTER — APPOINTMENT (OUTPATIENT)
Dept: HOME HEALTH SERVICES | Facility: HOME HEALTH | Age: 64
End: 2024-01-25

## 2024-01-25 DIAGNOSIS — N40.0 BENIGN PROSTATIC HYPERPLASIA WITHOUT LOWER URINARY TRACT SYMPMS: ICD-10-CM

## 2024-01-25 DIAGNOSIS — H54.3 UNQUALIFIED VISUAL LOSS, BOTH EYES: ICD-10-CM

## 2024-02-15 ENCOUNTER — APPOINTMENT (OUTPATIENT)
Dept: HOME HEALTH SERVICES | Facility: HOME HEALTH | Age: 64
End: 2024-02-15

## 2024-02-15 VITALS
RESPIRATION RATE: 18 BRPM | TEMPERATURE: 98.1 F | HEART RATE: 100 BPM | DIASTOLIC BLOOD PRESSURE: 82 MMHG | OXYGEN SATURATION: 95 % | SYSTOLIC BLOOD PRESSURE: 144 MMHG

## 2024-02-15 RX ORDER — LEVOFLOXACIN 250 MG/1
250 TABLET, FILM COATED ORAL DAILY
Qty: 7 | Refills: 0 | Status: DISCONTINUED | COMMUNITY
Start: 2024-01-16 | End: 2024-02-15

## 2024-02-15 RX ORDER — ALBUTEROL SULFATE 90 UG/1
108 (90 BASE) INHALANT RESPIRATORY (INHALATION)
Qty: 1 | Refills: 5 | Status: DISCONTINUED | COMMUNITY
Start: 2021-04-20 | End: 2024-02-15

## 2024-03-28 PROBLEM — F10.10 ALCOHOL ABUSE: Status: ACTIVE | Noted: 2019-08-30

## 2024-03-28 NOTE — COUNSELING
[Overweight - ( BMI 25.1 - 29.9 )] : overweight -  ( BMI 25.1 - 29.9 ) [TLC diet recommended] : TLC diet recommended [Smoker, not interested in quitting] : smoker, not interested in quitting [Smoke/CO Detectors] : smoke/CO detectors [] : foot exam [Decrease stress] : decrease stress [Discussed disease trajectory with patient/caregiver] : discussed disease trajectory with patient/caregiver [Advanced Directives discussed: ____] : Advanced directives discussed: [unfilled] [DNR] : Code Status: DNR [No Limitations] : Treatment Guidelines: No limitations [DNI] : Intubation: DNI [Last Verification Date: _____] : [facility] Completion/last verification date: [unfilled] [FreeTextEntry4] : Educated patient/legal representative about CCM services and consent.\par  Educated patient/legal representative that this service is available because they have 2 or more chronic conditions, that only one Provider can furnish CCM Services per calendar month and that CCM services are subject to the usual Medicare deductible and coinsurance. \par  Patient/legal representative understands the right to stop the CCM services at any time by notifying House Calls office.\par  Patient/legal representative has verbally consented 10/2021\par  \par

## 2024-03-28 NOTE — HISTORY OF PRESENT ILLNESS
[LastPCPVisitDate] : 2/23/23 [Patient] : patient [FreeTextEntry1] : gait and balance  [FreeTextEntry2] : 63 y.o. male with COPD, HTN, DM, High Cholesterol. Has hx of MI 4/4/2011. Reports dsypnea on exertion.  States even getting dressed is difficult.  Continues to smoke and drink.  Say he is cutting back switched to lights.  Renewed medications which he has not been using.  Labs and chest x-ray.  Consider pulmonary consult.    Patient denies fever, cough, trouble breathing, rash, vomiting and diarrhea. Patient has not been in close contact with someone covid positive.  N95 mask, gloves, eye wear and gown used during visit: [Y]. Total face to face time with patient is 40 min.   Patient/ patient's caregiver reports no weight loss >10lbs in the past 6 months. No changes in dentition or swallowing reported, No changes in hearing or vision reported. No changes in Cognition reported. Patient denies any symptoms of depression or anxiety. Patient is ADL and IADL independent. No changes in gait or falls reported.  Patient's home environment is safe.

## 2024-03-28 NOTE — CHRONIC CARE ASSESSMENT
[de-identified] : Risk for falls secondary to ETOH abuse [de-identified] : as tolerated [de-identified] : diabetic diet [PPS Score: ____] : Palliative Performance Scale (PPS) Score: [unfilled]

## 2024-03-28 NOTE — REVIEW OF SYSTEMS
[Fever] : no fever [Fatigue] : no fatigue [Chills] : no chills [FreeTextEntry2] : 59 o male awake and alert, mild facial swelling

## 2024-03-28 NOTE — HEALTH RISK ASSESSMENT
[HRA Reviewed] : Health risk assessment reviewed [Independent] : managing finances [Yes] : The patient does have visual impairment [No falls in past year] : Patient reported no falls in the past year [TimeGetUpGo] : 20 [de-identified] : ambulated independently with some assistance

## 2024-03-29 ENCOUNTER — APPOINTMENT (OUTPATIENT)
Dept: HOME HEALTH SERVICES | Facility: HOME HEALTH | Age: 64
End: 2024-03-29

## 2024-03-29 DIAGNOSIS — F10.10 ALCOHOL ABUSE, UNCOMPLICATED: ICD-10-CM

## 2024-03-31 ENCOUNTER — NON-APPOINTMENT (OUTPATIENT)
Age: 64
End: 2024-03-31

## 2024-04-11 ENCOUNTER — APPOINTMENT (OUTPATIENT)
Dept: HOME HEALTH SERVICES | Facility: HOME HEALTH | Age: 64
End: 2024-04-11
Payer: MEDICARE

## 2024-04-11 VITALS
HEART RATE: 102 BPM | OXYGEN SATURATION: 97 % | SYSTOLIC BLOOD PRESSURE: 130 MMHG | TEMPERATURE: 97 F | RESPIRATION RATE: 18 BRPM | DIASTOLIC BLOOD PRESSURE: 70 MMHG

## 2024-04-11 DIAGNOSIS — E78.5 HYPERLIPIDEMIA, UNSPECIFIED: ICD-10-CM

## 2024-04-11 DIAGNOSIS — E55.9 VITAMIN D DEFICIENCY, UNSPECIFIED: ICD-10-CM

## 2024-04-11 DIAGNOSIS — K59.00 CONSTIPATION, UNSPECIFIED: ICD-10-CM

## 2024-04-11 DIAGNOSIS — F32.A DEPRESSION, UNSPECIFIED: ICD-10-CM

## 2024-04-11 DIAGNOSIS — E11.59 TYPE 2 DIABETES MELLITUS WITH OTHER CIRCULATORY COMPLICATIONS: ICD-10-CM

## 2024-04-11 DIAGNOSIS — E11.9 TYPE 2 DIABETES MELLITUS W/OUT COMPLICATIONS: ICD-10-CM

## 2024-04-11 DIAGNOSIS — J44.9 CHRONIC OBSTRUCTIVE PULMONARY DISEASE, UNSPECIFIED: ICD-10-CM

## 2024-04-11 DIAGNOSIS — F17.210 NICOTINE DEPENDENCE, CIGARETTES, UNCOMPLICATED: ICD-10-CM

## 2024-04-11 DIAGNOSIS — I10 ESSENTIAL (PRIMARY) HYPERTENSION: ICD-10-CM

## 2024-04-11 DIAGNOSIS — J31.0 CHRONIC RHINITIS: ICD-10-CM

## 2024-04-11 DIAGNOSIS — F31.60 BIPOLAR DISORDER, CURRENT EPISODE MIXED, UNSPECIFIED: ICD-10-CM

## 2024-04-11 PROCEDURE — 99349 HOME/RES VST EST MOD MDM 40: CPT

## 2024-04-11 RX ORDER — FLUTICASONE FUROATE 27.5 UG/1
27.5 SPRAY, METERED NASAL DAILY
Qty: 1 | Refills: 3 | Status: ACTIVE | COMMUNITY
Start: 2024-04-11 | End: 1900-01-01

## 2024-04-11 NOTE — HISTORY OF PRESENT ILLNESS
[Patient] : patient [LastPCPVisitDate] : 2/23/23 [FreeTextEntry1] : gait and balance  [FreeTextEntry2] : 63 y.o. male lives in [facility] for past years. Pt is alert and oriented to time place, person and situation. Dx of  COPD, HTN, DM, High Cholesterol.  Continue to smoke about a pack a day.  Not willing to quit.  Drinks vodka 5 days a week.  Reports drinking calms him down.  Life stressors.  Girlfriend sick.  Followed by Dr. Crocker.   Patient denies fever, cough, trouble breathing, rash, vomiting and diarrhea. Patient has not been in close contact with someone covid positive.  N95 mask, gloves, eye wear and gown used during visit: [Y]. Total face to face time with patient is 40 min.   Patient/ patient's caregiver reports no weight loss >10lbs in the past 6 months. No changes in dentition or swallowing reported, No changes in hearing or vision reported. No changes in Cognition reported. Patient denies any symptoms of depression or anxiety. Patient is ADL and IADL independent. No changes in gait or falls reported.  Patient's home environment is safe.

## 2024-04-11 NOTE — CHRONIC CARE ASSESSMENT
[PPS Score: ____] : Palliative Performance Scale (PPS) Score: [unfilled] [de-identified] : as tolerated [de-identified] : Risk for falls secondary to ETOH abuse [de-identified] : diabetic diet

## 2024-04-11 NOTE — COUNSELING
[Overweight - ( BMI 25.1 - 29.9 )] : overweight -  ( BMI 25.1 - 29.9 ) [TLC diet recommended] : TLC diet recommended [Smoker, not interested in quitting] : smoker, not interested in quitting [Smoke/CO Detectors] : smoke/CO detectors [] : foot exam [Decrease stress] : decrease stress [Discussed disease trajectory with patient/caregiver] : discussed disease trajectory with patient/caregiver [Advanced Directives discussed: ____] : Advanced directives discussed: [unfilled] [DNR] : Code Status: DNR [No Limitations] : Treatment Guidelines: No limitations [DNI] : Intubation: DNI [Last Verification Date: _____] : [facility] Completion/last verification date: [unfilled] [FreeTextEntry4] : Educated patient/legal representative about CCM services and consent.\par  Educated patient/legal representative that this service is available because they have 2 or more chronic conditions, that only one Provider can furnish CCM Services per calendar month and that CCM services are subject to the usual Medicare deductible and coinsurance. \par  Patient/legal representative understands the right to stop the CCM services at any time by notifying House Calls office.\par  Patient/legal representative has verbally consented 10/2021\par  \par

## 2024-04-12 ENCOUNTER — LABORATORY RESULT (OUTPATIENT)
Age: 64
End: 2024-04-12

## 2024-04-13 LAB
ALBUMIN SERPL ELPH-MCNC: 4.6 G/DL
ALP BLD-CCNC: 122 U/L
ALT SERPL-CCNC: 37 U/L
ANION GAP SERPL CALC-SCNC: 22 MMOL/L
AST SERPL-CCNC: 42 U/L
BASOPHILS # BLD AUTO: 0.07 K/UL
BASOPHILS NFR BLD AUTO: 1.1 %
BILIRUB SERPL-MCNC: 0.8 MG/DL
BUN SERPL-MCNC: 13 MG/DL
CALCIUM SERPL-MCNC: 9.4 MG/DL
CHLORIDE SERPL-SCNC: 97 MMOL/L
CHOLEST SERPL-MCNC: 235 MG/DL
CO2 SERPL-SCNC: 22 MMOL/L
CREAT SERPL-MCNC: 0.86 MG/DL
EGFR: 97 ML/MIN/1.73M2
EOSINOPHIL # BLD AUTO: 0.09 K/UL
EOSINOPHIL NFR BLD AUTO: 1.4 %
ESTIMATED AVERAGE GLUCOSE: 114 MG/DL
GLUCOSE SERPL-MCNC: 56 MG/DL
HBA1C MFR BLD HPLC: 5.6 %
HCT VFR BLD CALC: 41.3 %
HDLC SERPL-MCNC: 112 MG/DL
HGB BLD-MCNC: 14.2 G/DL
IMM GRANULOCYTES NFR BLD AUTO: 0.2 %
LDLC SERPL CALC-MCNC: 115 MG/DL
LYMPHOCYTES # BLD AUTO: 1.26 K/UL
LYMPHOCYTES NFR BLD AUTO: 19.4 %
MAN DIFF?: NORMAL
MCHC RBC-ENTMCNC: 33.3 PG
MCHC RBC-ENTMCNC: 34.4 GM/DL
MCV RBC AUTO: 96.7 FL
MONOCYTES # BLD AUTO: 0.53 K/UL
MONOCYTES NFR BLD AUTO: 8.2 %
NEUTROPHILS # BLD AUTO: 4.54 K/UL
NEUTROPHILS NFR BLD AUTO: 69.7 %
NONHDLC SERPL-MCNC: 124 MG/DL
PLATELET # BLD AUTO: 189 K/UL
POTASSIUM SERPL-SCNC: 4.3 MMOL/L
PROT SERPL-MCNC: 7 G/DL
RBC # BLD: 4.27 M/UL
RBC # FLD: 13.5 %
SODIUM SERPL-SCNC: 141 MMOL/L
TRIGL SERPL-MCNC: 54 MG/DL
TSH SERPL-ACNC: 0.47 UIU/ML
WBC # FLD AUTO: 6.5 K/UL

## 2024-04-25 ENCOUNTER — APPOINTMENT (OUTPATIENT)
Dept: HOME HEALTH SERVICES | Facility: HOME HEALTH | Age: 64
End: 2024-04-25

## 2024-05-09 ENCOUNTER — APPOINTMENT (OUTPATIENT)
Dept: HOME HEALTH SERVICES | Facility: HOME HEALTH | Age: 64
End: 2024-05-09

## 2024-05-09 VITALS
HEART RATE: 100 BPM | SYSTOLIC BLOOD PRESSURE: 126 MMHG | DIASTOLIC BLOOD PRESSURE: 80 MMHG | RESPIRATION RATE: 17 BRPM | OXYGEN SATURATION: 95 % | TEMPERATURE: 98.3 F

## 2024-05-09 DIAGNOSIS — G89.29 PAIN IN RIGHT KNEE: ICD-10-CM

## 2024-05-09 DIAGNOSIS — M25.561 PAIN IN RIGHT KNEE: ICD-10-CM

## 2024-05-09 RX ORDER — FLUTICASONE FUROATE AND VILANTEROL 200; 25 UG/1; UG/1
200-25 POWDER RESPIRATORY (INHALATION)
Qty: 1 | Refills: 5 | Status: ACTIVE | COMMUNITY
Start: 2023-01-12

## 2024-05-09 RX ORDER — QUETIAPINE FUMARATE 300 MG/1
300 TABLET ORAL
Qty: 60 | Refills: 0 | Status: ACTIVE | COMMUNITY
Start: 2019-12-30

## 2024-05-09 RX ORDER — ZOLPIDEM TARTRATE 10 MG/1
10 TABLET ORAL
Qty: 30 | Refills: 0 | Status: ACTIVE | COMMUNITY
Start: 2019-09-30

## 2024-05-09 RX ORDER — ALBUTEROL SULFATE 90 UG/1
108 (90 BASE) AEROSOL, METERED RESPIRATORY (INHALATION) EVERY 4 HOURS
Qty: 1 | Refills: 5 | Status: ACTIVE | COMMUNITY
Start: 2019-11-22

## 2024-05-09 RX ORDER — FOLIC ACID 1 MG/1
1 TABLET ORAL DAILY
Qty: 30 | Refills: 6 | Status: ACTIVE | COMMUNITY
Start: 2021-05-27

## 2024-05-09 RX ORDER — OXYBUTYNIN CHLORIDE 5 MG/1
5 TABLET, EXTENDED RELEASE ORAL
Qty: 90 | Refills: 3 | Status: ACTIVE | COMMUNITY
Start: 2021-07-22

## 2024-05-09 RX ORDER — TRAZODONE HYDROCHLORIDE 100 MG/1
100 TABLET ORAL
Qty: 60 | Refills: 0 | Status: ACTIVE | COMMUNITY
Start: 2019-12-30

## 2024-05-09 RX ORDER — MONTELUKAST 10 MG/1
10 TABLET, FILM COATED ORAL
Qty: 30 | Refills: 5 | Status: ACTIVE | COMMUNITY
Start: 2020-02-07

## 2024-05-09 RX ORDER — FLUTICASONE FUROATE 27.5 UG/1
27.5 SPRAY, METERED NASAL DAILY
Qty: 1 | Refills: 3 | Status: ACTIVE | COMMUNITY
Start: 2023-02-23

## 2024-05-16 ENCOUNTER — APPOINTMENT (OUTPATIENT)
Dept: HOME HEALTH SERVICES | Facility: HOME HEALTH | Age: 64
End: 2024-05-16

## 2024-05-30 ENCOUNTER — APPOINTMENT (OUTPATIENT)
Dept: HOME HEALTH SERVICES | Facility: HOME HEALTH | Age: 64
End: 2024-05-30

## 2024-07-11 ENCOUNTER — APPOINTMENT (OUTPATIENT)
Dept: HOME HEALTH SERVICES | Facility: HOME HEALTH | Age: 64
End: 2024-07-11

## 2024-07-11 DIAGNOSIS — E11.9 TYPE 2 DIABETES MELLITUS W/OUT COMPLICATIONS: ICD-10-CM

## 2024-07-11 DIAGNOSIS — J44.9 CHRONIC OBSTRUCTIVE PULMONARY DISEASE, UNSPECIFIED: ICD-10-CM

## 2024-07-11 DIAGNOSIS — E11.59 TYPE 2 DIABETES MELLITUS WITH OTHER CIRCULATORY COMPLICATIONS: ICD-10-CM

## 2024-07-11 DIAGNOSIS — E78.5 HYPERLIPIDEMIA, UNSPECIFIED: ICD-10-CM

## 2024-07-11 DIAGNOSIS — I10 ESSENTIAL (PRIMARY) HYPERTENSION: ICD-10-CM

## 2024-07-11 DIAGNOSIS — F31.60 BIPOLAR DISORDER, CURRENT EPISODE MIXED, UNSPECIFIED: ICD-10-CM

## 2024-07-11 DIAGNOSIS — K59.00 CONSTIPATION, UNSPECIFIED: ICD-10-CM

## 2024-07-11 DIAGNOSIS — E55.9 VITAMIN D DEFICIENCY, UNSPECIFIED: ICD-10-CM

## 2024-07-11 DIAGNOSIS — F10.10 ALCOHOL ABUSE, UNCOMPLICATED: ICD-10-CM

## 2024-07-11 DIAGNOSIS — F32.A DEPRESSION, UNSPECIFIED: ICD-10-CM

## 2024-07-11 DIAGNOSIS — F17.210 NICOTINE DEPENDENCE, CIGARETTES, UNCOMPLICATED: ICD-10-CM

## 2024-08-08 ENCOUNTER — APPOINTMENT (OUTPATIENT)
Dept: HOME HEALTH SERVICES | Facility: HOME HEALTH | Age: 64
End: 2024-08-08

## 2024-08-08 PROCEDURE — 99349 HOME/RES VST EST MOD MDM 40: CPT

## 2024-08-08 NOTE — CHRONIC CARE ASSESSMENT
[PPS Score: ____] : Palliative Performance Scale (PPS) Score: [unfilled] [de-identified] : Risk for falls secondary to ETOH abuse [de-identified] : as tolerated [de-identified] : diabetic diet

## 2024-08-08 NOTE — COUNSELING
[Overweight - ( BMI 25.1 - 29.9 )] : overweight -  ( BMI 25.1 - 29.9 ) [TLC diet recommended] : TLC diet recommended [Smoker, not interested in quitting] : smoker, not interested in quitting [Smoke/CO Detectors] : smoke/CO detectors [] : foot exam [Decrease stress] : decrease stress [Discussed disease trajectory with patient/caregiver] : discussed disease trajectory with patient/caregiver [Advanced Directives discussed: ____] : Advanced directives discussed: [unfilled] [DNR] : Code Status: DNR [No Limitations] : Treatment Guidelines: No limitations [DNI] : Intubation: DNI [Last Ver[facility] Date: _____] : [facility] Completion/last verification date: [unfilled] [FreeTextEntry4] : Educated patient/legal representative about CCM services and consent.\par  Educated patient/legal representative that this service is available because they have 2 or more chronic conditions, that only one Provider can furnish CCM Services per calendar month and that CCM services are subject to the usual Medicare deductible and coinsurance. \par  Patient/legal representative understands the right to stop the CCM services at any time by notifying House Calls office.\par  Patient/legal representative has verbally consented 10/2021\par  \par

## 2024-08-08 NOTE — HISTORY OF PRESENT ILLNESS
[Patient] : patient [LastPCPVisitDate] : 2/23/23 [FreeTextEntry1] : gait and balance  [FreeTextEntry2] : 63 y.o. male lives in [facility] for past years. Pt is alert and oriented to time place, person and situation. Dx of  COPD, HTN, DM, High Cholesterol.  Continue to smoke about a pack a day.  Not willing to quit.  Drinks vodka 5 days a week.  Reports drinking calms him down.  Life stressors.  Girlfriend sick.  Followed by Dr. Crocker.   Patient denies fever, cough, trouble breathing, rash, vomiting and diarrhea. Patient has not been in close contact with someone covid positive.  N95 mask, gloves, eye wear and gown used during visit: [Y]. Total face to face time with patient is 40 min.   Patient/ patient's caregiver reports no weight loss >10lbs in the past 6 months. No changes in dentition or swallowing reported, No changes in hearing or vision reported. No changes in Cognition reported. Patient denies any symptoms of depression or anxiety. Patient is ADL and IADL independent. No changes in gait or falls reported.  Patient's home environment is safe.

## 2024-08-09 ENCOUNTER — LABORATORY RESULT (OUTPATIENT)
Age: 64
End: 2024-08-09

## 2024-08-10 ENCOUNTER — NON-APPOINTMENT (OUTPATIENT)
Age: 64
End: 2024-08-10

## 2024-08-12 ENCOUNTER — LABORATORY RESULT (OUTPATIENT)
Age: 64
End: 2024-08-12

## 2024-08-12 ENCOUNTER — NON-APPOINTMENT (OUTPATIENT)
Age: 64
End: 2024-08-12

## 2024-08-13 ENCOUNTER — LABORATORY RESULT (OUTPATIENT)
Age: 64
End: 2024-08-13

## 2024-08-13 ENCOUNTER — NON-APPOINTMENT (OUTPATIENT)
Age: 64
End: 2024-08-13

## 2024-08-14 ENCOUNTER — NON-APPOINTMENT (OUTPATIENT)
Age: 64
End: 2024-08-14

## 2024-10-31 ENCOUNTER — APPOINTMENT (OUTPATIENT)
Dept: HOME HEALTH SERVICES | Facility: HOME HEALTH | Age: 64
End: 2024-10-31
Payer: MEDICARE

## 2024-10-31 VITALS
SYSTOLIC BLOOD PRESSURE: 130 MMHG | RESPIRATION RATE: 18 BRPM | DIASTOLIC BLOOD PRESSURE: 70 MMHG | HEART RATE: 88 BPM | OXYGEN SATURATION: 94 % | TEMPERATURE: 98 F

## 2024-10-31 DIAGNOSIS — F10.10 ALCOHOL ABUSE, UNCOMPLICATED: ICD-10-CM

## 2024-10-31 DIAGNOSIS — J44.9 CHRONIC OBSTRUCTIVE PULMONARY DISEASE, UNSPECIFIED: ICD-10-CM

## 2024-10-31 DIAGNOSIS — E55.9 VITAMIN D DEFICIENCY, UNSPECIFIED: ICD-10-CM

## 2024-10-31 DIAGNOSIS — E11.59 TYPE 2 DIABETES MELLITUS WITH OTHER CIRCULATORY COMPLICATIONS: ICD-10-CM

## 2024-10-31 DIAGNOSIS — F32.A DEPRESSION, UNSPECIFIED: ICD-10-CM

## 2024-10-31 DIAGNOSIS — F31.60 BIPOLAR DISORDER, CURRENT EPISODE MIXED, UNSPECIFIED: ICD-10-CM

## 2024-10-31 DIAGNOSIS — I10 ESSENTIAL (PRIMARY) HYPERTENSION: ICD-10-CM

## 2024-10-31 DIAGNOSIS — E78.5 HYPERLIPIDEMIA, UNSPECIFIED: ICD-10-CM

## 2024-10-31 DIAGNOSIS — E11.9 TYPE 2 DIABETES MELLITUS W/OUT COMPLICATIONS: ICD-10-CM

## 2024-10-31 DIAGNOSIS — F17.210 NICOTINE DEPENDENCE, CIGARETTES, UNCOMPLICATED: ICD-10-CM

## 2024-10-31 PROCEDURE — 99349 HOME/RES VST EST MOD MDM 40: CPT

## 2024-11-01 ENCOUNTER — LABORATORY RESULT (OUTPATIENT)
Age: 64
End: 2024-11-01

## 2024-12-05 ENCOUNTER — NON-APPOINTMENT (OUTPATIENT)
Age: 64
End: 2024-12-05

## 2025-01-09 ENCOUNTER — APPOINTMENT (OUTPATIENT)
Dept: HOME HEALTH SERVICES | Facility: HOME HEALTH | Age: 65
End: 2025-01-09
Payer: MEDICARE

## 2025-01-09 VITALS
TEMPERATURE: 98 F | SYSTOLIC BLOOD PRESSURE: 130 MMHG | DIASTOLIC BLOOD PRESSURE: 70 MMHG | OXYGEN SATURATION: 95 % | HEART RATE: 80 BPM | RESPIRATION RATE: 18 BRPM

## 2025-01-09 DIAGNOSIS — F10.10 ALCOHOL ABUSE, UNCOMPLICATED: ICD-10-CM

## 2025-01-09 DIAGNOSIS — E78.5 HYPERLIPIDEMIA, UNSPECIFIED: ICD-10-CM

## 2025-01-09 DIAGNOSIS — K59.00 CONSTIPATION, UNSPECIFIED: ICD-10-CM

## 2025-01-09 DIAGNOSIS — F31.60 BIPOLAR DISORDER, CURRENT EPISODE MIXED, UNSPECIFIED: ICD-10-CM

## 2025-01-09 DIAGNOSIS — I10 ESSENTIAL (PRIMARY) HYPERTENSION: ICD-10-CM

## 2025-01-09 DIAGNOSIS — E11.9 TYPE 2 DIABETES MELLITUS W/OUT COMPLICATIONS: ICD-10-CM

## 2025-01-09 DIAGNOSIS — F32.A DEPRESSION, UNSPECIFIED: ICD-10-CM

## 2025-01-09 DIAGNOSIS — F17.210 NICOTINE DEPENDENCE, CIGARETTES, UNCOMPLICATED: ICD-10-CM

## 2025-01-09 DIAGNOSIS — J44.9 CHRONIC OBSTRUCTIVE PULMONARY DISEASE, UNSPECIFIED: ICD-10-CM

## 2025-01-09 DIAGNOSIS — R06.09 OTHER FORMS OF DYSPNEA: ICD-10-CM

## 2025-01-09 DIAGNOSIS — E11.59 TYPE 2 DIABETES MELLITUS WITH OTHER CIRCULATORY COMPLICATIONS: ICD-10-CM

## 2025-01-09 PROCEDURE — 99349 HOME/RES VST EST MOD MDM 40: CPT

## 2025-03-06 ENCOUNTER — APPOINTMENT (OUTPATIENT)
Dept: HOME HEALTH SERVICES | Facility: HOME HEALTH | Age: 65
End: 2025-03-06

## 2025-03-06 DIAGNOSIS — J44.9 CHRONIC OBSTRUCTIVE PULMONARY DISEASE, UNSPECIFIED: ICD-10-CM

## 2025-03-06 DIAGNOSIS — F17.210 NICOTINE DEPENDENCE, CIGARETTES, UNCOMPLICATED: ICD-10-CM

## 2025-03-06 DIAGNOSIS — E78.5 HYPERLIPIDEMIA, UNSPECIFIED: ICD-10-CM

## 2025-03-06 DIAGNOSIS — F10.10 ALCOHOL ABUSE, UNCOMPLICATED: ICD-10-CM

## 2025-03-06 DIAGNOSIS — E11.9 TYPE 2 DIABETES MELLITUS W/OUT COMPLICATIONS: ICD-10-CM

## 2025-03-06 DIAGNOSIS — H54.3 UNQUALIFIED VISUAL LOSS, BOTH EYES: ICD-10-CM

## 2025-03-06 DIAGNOSIS — E11.59 TYPE 2 DIABETES MELLITUS WITH OTHER CIRCULATORY COMPLICATIONS: ICD-10-CM

## 2025-03-06 DIAGNOSIS — I10 ESSENTIAL (PRIMARY) HYPERTENSION: ICD-10-CM

## 2025-03-06 DIAGNOSIS — K59.00 CONSTIPATION, UNSPECIFIED: ICD-10-CM

## 2025-03-06 DIAGNOSIS — F31.60 BIPOLAR DISORDER, CURRENT EPISODE MIXED, UNSPECIFIED: ICD-10-CM

## 2025-03-20 ENCOUNTER — APPOINTMENT (OUTPATIENT)
Dept: HOME HEALTH SERVICES | Facility: HOME HEALTH | Age: 65
End: 2025-03-20

## 2025-04-03 ENCOUNTER — APPOINTMENT (OUTPATIENT)
Dept: HOME HEALTH SERVICES | Facility: HOME HEALTH | Age: 65
End: 2025-04-03

## 2025-05-01 ENCOUNTER — APPOINTMENT (OUTPATIENT)
Dept: HOME HEALTH SERVICES | Facility: HOME HEALTH | Age: 65
End: 2025-05-01

## 2025-05-09 NOTE — PAST MEDICAL HISTORY
Alcohol dependence with withdrawal Alcohol dependence with withdrawal [PMH Reviewed and Updated] : past medical history reviewed and updated

## 2025-05-29 ENCOUNTER — APPOINTMENT (OUTPATIENT)
Dept: HOME HEALTH SERVICES | Facility: HOME HEALTH | Age: 65
End: 2025-05-29

## 2025-07-10 ENCOUNTER — APPOINTMENT (OUTPATIENT)
Dept: HOME HEALTH SERVICES | Facility: HOME HEALTH | Age: 65
End: 2025-07-10
Payer: MEDICARE

## 2025-07-10 VITALS
RESPIRATION RATE: 18 BRPM | OXYGEN SATURATION: 96 % | TEMPERATURE: 97 F | HEART RATE: 72 BPM | DIASTOLIC BLOOD PRESSURE: 70 MMHG | SYSTOLIC BLOOD PRESSURE: 130 MMHG

## 2025-07-10 PROCEDURE — 99349 HOME/RES VST EST MOD MDM 40: CPT

## 2025-07-12 LAB
25(OH)D3 SERPL-MCNC: 20.3 NG/ML
ESTIMATED AVERAGE GLUCOSE: 103 MG/DL
HBA1C MFR BLD HPLC: 5.2 %
HCT VFR BLD CALC: 41.6 %
HGB BLD-MCNC: 13.8 G/DL
MCHC RBC-ENTMCNC: 33.2 G/DL
MCHC RBC-ENTMCNC: 35.1 PG
MCV RBC AUTO: 105.9 FL
PLATELET # BLD AUTO: 259 K/UL
RBC # BLD: 3.93 M/UL
RBC # FLD: 14.5 %
TSH SERPL-ACNC: 0.79 UIU/ML
WBC # FLD AUTO: 8.21 K/UL

## 2025-08-21 ENCOUNTER — APPOINTMENT (OUTPATIENT)
Dept: HOME HEALTH SERVICES | Facility: HOME HEALTH | Age: 65
End: 2025-08-21